# Patient Record
Sex: FEMALE | Race: WHITE | Employment: FULL TIME | URBAN - NONMETROPOLITAN AREA
[De-identification: names, ages, dates, MRNs, and addresses within clinical notes are randomized per-mention and may not be internally consistent; named-entity substitution may affect disease eponyms.]

---

## 2017-05-18 LAB
CHOLESTEROL, TOTAL: 180 MG/DL
CHOLESTEROL/HDL RATIO: 3
CREATININE URINE: 39.97 MG/DL
HBA1C MFR BLD: 6.5 %
HDLC SERPL-MCNC: 61 MG/DL (ref 35–70)
LDL CHOLESTEROL CALCULATED: 109 MG/DL (ref 0–160)
MICROALBUMIN/CREAT 24H UR: <0.7 MG/G{CREAT}
TRIGL SERPL-MCNC: 51 MG/DL
VLDLC SERPL CALC-MCNC: 10 MG/DL

## 2020-12-22 ENCOUNTER — HOSPITAL ENCOUNTER (OUTPATIENT)
Age: 25
Setting detail: SPECIMEN
Discharge: HOME | End: 2020-12-22
Payer: COMMERCIAL

## 2020-12-22 ENCOUNTER — OFFICE VISIT (OUTPATIENT)
Dept: PRIMARY CARE CLINIC | Age: 25
End: 2020-12-22
Payer: COMMERCIAL

## 2020-12-22 VITALS
OXYGEN SATURATION: 99 % | HEART RATE: 73 BPM | HEIGHT: 61 IN | BODY MASS INDEX: 21.3 KG/M2 | DIASTOLIC BLOOD PRESSURE: 64 MMHG | TEMPERATURE: 97.9 F | WEIGHT: 112.8 LBS | SYSTOLIC BLOOD PRESSURE: 110 MMHG | RESPIRATION RATE: 18 BRPM

## 2020-12-22 PROCEDURE — U0003 INFECTIOUS AGENT DETECTION BY NUCLEIC ACID (DNA OR RNA); SEVERE ACUTE RESPIRATORY SYNDROME CORONAVIRUS 2 (SARS-COV-2) (CORONAVIRUS DISEASE [COVID-19]), AMPLIFIED PROBE TECHNIQUE, MAKING USE OF HIGH THROUGHPUT TECHNOLOGIES AS DESCRIBED BY CMS-2020-01-R: HCPCS

## 2020-12-22 PROCEDURE — 99213 OFFICE O/P EST LOW 20 MIN: CPT | Performed by: NURSE PRACTITIONER

## 2020-12-22 PROCEDURE — 99212 OFFICE O/P EST SF 10 MIN: CPT | Performed by: NURSE PRACTITIONER

## 2020-12-22 ASSESSMENT — ENCOUNTER SYMPTOMS
COUGH: 1
ABDOMINAL PAIN: 0
SHORTNESS OF BREATH: 0
SINUS PRESSURE: 1
VOMITING: 0
NAUSEA: 0
PHOTOPHOBIA: 0
RHINORRHEA: 1
DIARRHEA: 1
SORE THROAT: 1
CHEST TIGHTNESS: 0
WHEEZING: 0

## 2020-12-22 ASSESSMENT — PATIENT HEALTH QUESTIONNAIRE - PHQ9
SUM OF ALL RESPONSES TO PHQ QUESTIONS 1-9: 0
SUM OF ALL RESPONSES TO PHQ9 QUESTIONS 1 & 2: 0
1. LITTLE INTEREST OR PLEASURE IN DOING THINGS: 0
2. FEELING DOWN, DEPRESSED OR HOPELESS: 0
SUM OF ALL RESPONSES TO PHQ QUESTIONS 1-9: 0
SUM OF ALL RESPONSES TO PHQ QUESTIONS 1-9: 0

## 2020-12-22 NOTE — PATIENT INSTRUCTIONS
Patient Education        Learning About Coronavirus [phone])  Coronavirus [phone]): Overview  What is coronavirus ([id number])? The coronavirus disease (COVID-19) is caused by a virus. It is an illness that was first found in December 2019. It has since spread worldwide. The virus can cause fever, cough, and trouble breathing. In severe cases, it can cause pneumonia and make it hard to breathe without help. It can cause death. This virus spreads person-to-person through droplets from coughing and sneezing. It can also spread when you are close to someone who is infected. And it can spread when you touch something that has the virus on it, such as a doorknob or a tabletop. Coronaviruses are a large group of viruses. They cause the common cold. They also cause more serious illnesses like [place] respiratory syndrome (MERS) and severe acute respiratory syndrome (SARS). COVID-19 is caused by a novel coronavirus. That means it's a new type that has not been seen in people before. How is COVID-19 treated? Mild illness can be treated at home, but more serious illness needs to be treated in the hospital. Treatment may include medicines to reduce symptoms, plus breathing support such as oxygen therapy or a ventilator. Other treatments, such as antiviral medicines, may help people who have COVID-19. What can you do to protect yourself from COVID-19? The best way to protect yourself from getting sick is to:  · Avoid areas where there is an outbreak. · Avoid contact with people who may be infected. · Avoid crowds and try to stay at least 6 feet away from other people. · Wash your hands often, especially after you cough or sneeze. Use soap and water, and scrub for at least 20 seconds. If soap and water aren't available, use an alcohol-based hand . · Avoid touching your mouth, nose, and eyes. What can you do to avoid spreading the virus to others?   To help avoid spreading the virus to others:  · [address] your hands often with soap or alcohol-based hand sanitizers. · Cover your mouth with a tissue when you cough or sneeze. Then throw the tissue in the trash. · Use a disinfectant to clean things that you touch often. These include doorknobs, remote controls, phones, and handles on your refrigerator and microwave. And don't forget countertops, tabletops, bathrooms, and computer keyboards. · Wear a cloth face cover if you have to go to public areas. If you know or suspect that you have COVID-19:  · Stay home. Don't go to school, work, or public areas. And don't use public transportation, ride-shares, or taxis unless you have no choice. · Leave your home only if you need to get medical care or testing. But call the doctor's office first so they know you're coming. And wear a face cover. · Limit contact with people in your home. If possible, stay in a separate bedroom and use a separate bathroom. · Wear a face cover whenever you're around other people. It can help stop the spread of the virus when you cough or sneeze. · Clean and disinfect your home every day. Use household  and disinfectant wipes or sprays. Take special care to clean things that you grab with your hands. · Self-isolate until it's safe to be around others again. ? If you have symptoms, it's safe when you haven't had a fever for 3 days and your symptoms have improved and it's been at least 10 days since your symptoms started. ? If you were exposed to the virus but don't have symptoms, it's safe to be around others 14 days after exposure. ? Talk to your doctor about whether you also need testing, especially if you have a weakened immune system. When to call for help  Call 911 anytime you think you may need emergency care. For example, call if:  · You have severe trouble breathing. (You can't talk at all.)  · You have constant chest pain or pressure. · You are severely dizzy or lightheaded.   · You are confused or can't think clearly. · Your face and lips have a blue color. · You passed out (lost consciousness) or are very hard to wake up. Call your doctor now if you develop symptoms such as:  · Shortness of breath. · Fever. · Cough. If you need to get care, call ahead to the doctor's office for instructions before you go. Make sure you wear a face cover to prevent exposing other people to the virus. Where can you get the latest information? The following health organizations are tracking and studying this virus. Their websites contain the most up-to-date information. Darya Parra also learn what to do if you think you may have been exposed to the virus. · U.S. Centers for Disease Control and Prevention (CDC): The CDC provides updated news about the disease and travel advice. The website also tells you how to prevent the spread of infection. www.cdc.gov  · World Health Organization [facility]): WHO offers information about the virus outbreaks. WHO also has travel advice. www.who.int  Current as of: July 10, 2020               Content Version: 12.6  © 2006-2020 [employer]. Care instructions adapted under license by [facility] ([facility]). If you have questions about a medical condition or this instruction, always ask your healthcare professional. [address] any warranty or liability for your use of this information. Patient Education        Coronavirus (XTYRR-07): Care Instructions  Overview  The coronavirus disease (COVID-19) is caused by a virus. Symptoms may include a fever, a cough, and shortness of breath. It mainly spreads person-to-person through droplets from coughing and sneezing. The virus also can spread when people are in close contact with someone who is infected. Most people have mild symptoms and can take care of themselves at home.  If their symptoms get worse, they may need care in a hospital. Treatment may include medicines to reduce symptoms, plus breathing support such as oxygen therapy or a ventilator. It's important to not spread the virus to others. If you have COVID-19, wear a face cover anytime you are around other people. You need to isolate yourself while you are sick. Leave your home only if you need to get medical care or testing. Follow-up care is a key part of your treatment and safety. Be sure to make and go to all appointments, and call your doctor if you are having problems. It's also a good idea to know your test results and keep a list of the medicines you take. How can you care for yourself at home? · Get extra rest. It can help you feel better. · Drink plenty of fluids. This helps replace fluids lost from fever. Fluids also help ease a scratchy throat. Water, soup, fruit juice, and hot tea with lemon are good choices. · Take acetaminophen (such as Tylenol) to reduce a fever. It may also help with muscle aches. Read and follow all instructions on the label. · Use petroleum jelly on sore skin. This can help if the skin around your nose and lips becomes sore from rubbing a lot with tissues. Tips for self-isolation  · Limit contact with people in your home. If possible, stay in a separate bedroom and use a separate bathroom. · Wear a cloth face cover when you are around other people. It can help stop the spread of the virus when you cough or sneeze. · If you have to leave home, avoid crowds and try to stay at least 6 feet away from other people. · Avoid contact with pets and other animals. · Cover your mouth and nose with a tissue when you cough or sneeze. Then throw it in the trash right away. · Wash your hands often, especially after you cough or sneeze. Use soap and water, and scrub for at least 20 seconds. If soap and water aren't available, use an alcohol-based hand . · Don't share personal household items. These include bedding, towels, cups and glasses, and eating utensils.   · [employer] laundry in the warmest water allowed for the fabric type, and dry it completely. It's okay to wash other people's laundry with yours. · Clean and disinfect your home every day. Use household  and disinfectant wipes or sprays. Take special care to clean things that you grab with your hands. These include doorknobs, remote controls, phones, and handles on your refrigerator and microwave. And don't forget countertops, tabletops, bathrooms, and computer keyboards. When you can end self-isolation  · If you know or suspect that you have COVID-19, stay in self-isolation until:  ? You haven't had a fever for 3 days, and  ? Your symptoms have improved, and  ? It's been at least 10 days since your symptoms started. · Talk to your doctor about whether you also need testing, especially if you have a weakened immune system. When should you call for help? Call 911 anytime you think you may need emergency care. For example, call if you have life-threatening symptoms, such as:    · You have severe trouble breathing. (You can't talk at all.)     · You have constant chest pain or pressure.     · You are severely dizzy or lightheaded.     · You are confused or can't think clearly.     · Your face and lips have a blue color.     · You pass out (lose consciousness) or are very hard to wake up. Call your doctor now or seek immediate medical care if:    · You have moderate trouble breathing. (You can't speak a full sentence.)     · You are coughing up blood (more than about 1 teaspoon).     · You have signs of low blood pressure. These include feeling lightheaded; being too weak to stand; and having cold, pale, clammy skin. Watch closely for changes in your health, and be sure to contact your doctor if:    · Your symptoms get worse.     · You are not getting better as expected. Call before you go to the doctor's office. Follow their instructions. And wear a cloth face cover. Current as of: July 10, 2020               Content Version: 12.6  © 6087-7411 [facility]. Care instructions adapted under license by [facility] ([facility]). If you have questions about a medical condition or this instruction, always ask your healthcare professional. [place] [address] any warranty or liability for your use of this information. Patient Education        Viral Respiratory Infection: Care Instructions  Your Care Instructions     Viruses are very small organisms. They grow in number after they enter your body. There are many types that cause different illnesses, such as colds and the mumps. The symptoms of a viral respiratory infection often start quickly. They include a fever, sore throat, and runny nose. You may also just not feel well. Or you may not want to eat much. Most viral respiratory infections are not serious. They usually get better with time and self-care. Antibiotics are not used to treat a viral infection. That's because antibiotics will not help cure a viral illness. In some cases, antiviral medicine can help your body fight a serious viral infection. Follow-up care is a key part of your treatment and safety. Be sure to make and go to all appointments, and call your doctor if you are having problems. It's also a good idea to know your test results and keep a list of the medicines you take. How can you care for yourself at home? · Rest as much as possible until you feel better. · Be safe with medicines. Take your medicine exactly as prescribed. Call your doctor if you think you are having a problem with your medicine. You will get more details on the specific medicine your doctor prescribes. · Take an over-the-counter pain medicine, such as acetaminophen (Tylenol), ibuprofen (Advil, Motrin), or naproxen (Aleve), as needed for pain and fever. Read and follow all instructions on the label. Do not give aspirin to anyone younger than 20. It has been linked to Reye syndrome, a serious illness.   · Drink plenty of fluids, enough so that your urine is light yellow or clear like water. Hot fluids, such as tea or soup, may help relieve congestion in your nose and throat. If you have kidney, heart, or liver disease and have to limit fluids, talk with your doctor before you increase the amount of fluids you drink. · Try to clear mucus from your lungs by breathing deeply and coughing. · Gargle with warm salt water once an hour. This can help reduce swelling and throat pain. Use 1 teaspoon of salt mixed in 1 cup of warm water. · Do not smoke or allow others to smoke around you. If you need help quitting, talk to your doctor about stop-smoking programs and medicines. These can increase your chances of quitting for good. To avoid spreading the virus  · Cough or sneeze into a tissue. Then throw the tissue away. · If you don't have a tissue, use your hand to cover your cough or sneeze. Then clean your hand. You can also cough into your sleeve. · Wash your hands often. Use soap and warm water. Wash for 15 to 20 seconds each time. · If you don't have soap and water near you, you can clean your hands with alcohol wipes or gel. When should you call for help? Call your doctor now or seek immediate medical care if:    · You have a new or higher fever.     · Your fever lasts more than 48 hours.     · You have trouble breathing.     · You have a fever with a stiff neck or a severe headache.     · You are sensitive to light.     · You feel very sleepy or confused. Watch closely for changes in your health, and be sure to contact your doctor if:    · You do not get better as expected. Where can you learn more? Go to [email or web address]://[email or web address].[email or web address]. org and sign in to your [email or web address] account. Enter R716 in the [facility] box to learn more about \"Viral Respiratory Infection: Care Instructions. \"     If you do not have an account, please click on the \"Sign Up Now\" link.   Current as of: February 24, 2020               Content Version: 12.6  © 8940-6606 [employer]. Care instructions adapted under license by [facility] ([facility]). If you have questions about a medical condition or this instruction, always ask your healthcare professional. [address] any warranty or liability for your use of this information. Will notify you of COVID test results as soon as available. You should isoloate at home in an area away from family. If you must be around family members, please wear a mask. Quarantine at home until result is available. This means do not go to work/school, attend family gatherings, or invite others to your home until you know your test results.

## 2020-12-22 NOTE — LETTER
[address] [facility]  [address] [facility] [address]  Phone: [phone]  Fax: [phone]    CONY Ward CNP        December 22, 2020     Patient: Elease Favre   YOB: 1995   Date of Visit: 12/22/2020       To Whom it May Concern:    Mariam Eric was seen in my clinic on 12/22/2020. May return to work with negative Covid-19 test result and improved symptoms. Test result in 3-7 days. If you have any questions or concerns, please don't hesitate to call.     Sincerely,         CONY Ward CNP

## 2020-12-22 NOTE — PROGRESS NOTES
[facility]             [address] [facility] [address], [address]                        Telephone [phone]             Fax [phone]     Andrew Madison  1995  DFN:[id number]   Date of visit:  12/22/2020    Subjective:    Andrew Madison is a 31 y.o.  female who presents to [facility] today (12/22/2020) for evaluation of:    Chief Complaint   Patient presents with    Headache     ST, cough, runny noise, congestion, started 2-3 past       Headache   This is a new problem. The current episode started in the past 7 days (12/19/20). The problem occurs intermittently. The problem has been waxing and waning. The pain is located in the frontal region. The pain does not radiate. The pain quality is similar to prior headaches. The quality of the pain is described as aching and dull. The pain is at a severity of 6/10. Associated symptoms include coughing (dry), rhinorrhea, sinus pressure and a sore throat. Pertinent negatives include no abdominal pain, fever, muscle aches, nausea, phonophobia, photophobia or vomiting. Associated symptoms comments: Nasal congestion; postnasal drainage. Nothing aggravates the symptoms. Treatments tried: ibuprofen. The treatment provided mild relief. Exposure to several coworkers who tested positive for Covid in the last 2 weeks. She has the following problem list:  Patient Active Problem List   Diagnosis    Diabetes mellitus type 1 ([address]    Dysmenorrhea    Allergic rhinitis    Hyperlipidemia        Current medications are:  Current Outpatient Medications   Medication Sig Dispense Refill    insulin glargine (LANTUS SOLOSTAR) 100 UNIT/ML injection pen Inject 11 Units into the skin daily.  5 Pen 11    insulin lispro (HUMALOG KWIKPEN) 100 UNIT/ML pen Per sliding scale as needed four times daily (uses up to about 15 units a day) 5 Pen 11    Insulin Pen Needle (PEN NEEDLES 5/16\") 30G X 8 MM MISC 1 each by Does not apply route 4 times daily. 100 each 20     No current facility-administered medications for this visit. She is allergic to other and citalopram..    She  reports that she has never smoked. She has never used smokeless tobacco.      Objective:    Vitals:    12/22/20 0930   BP: 110/64   Site: Right Upper Arm   Position: Sitting   Pulse: 73   Resp: 18   Temp: 97.9 °F (36.6 °C)   SpO2: 99%   Weight: 112 lb 12.8 oz (51.2 kg)   Height: 5' 1\" (1.549 m)     Body mass index is 21.31 kg/m². Review of Systems   Constitutional: Positive for appetite change, chills and fatigue. Negative for fever. HENT: Positive for congestion, postnasal drip, rhinorrhea, sinus pressure and sore throat. Eyes: Negative for photophobia. Respiratory: Positive for cough (dry). Negative for chest tightness, shortness of breath and wheezing. Cardiovascular: Negative. Gastrointestinal: Positive for diarrhea (loose stools). Negative for abdominal pain, nausea and vomiting. Neurological: Positive for headaches. Physical Exam  Vitals signs and nursing note reviewed. Constitutional:       Appearance: She is well-developed. HENT:      Head: Normocephalic. Jaw: There is normal jaw occlusion. Right Ear: Tympanic membrane, ear canal and external ear normal.      Left Ear: Tympanic membrane, ear canal and external ear normal.      Nose: Congestion present. Right Turbinates: Swollen (erythema). Left Turbinates: Swollen (erythema). Right Sinus: No maxillary sinus tenderness or frontal sinus tenderness. Left Sinus: No maxillary sinus tenderness or frontal sinus tenderness. Mouth/Throat:      Lips: Pink. Mouth: Mucous membranes are moist.      Pharynx: Oropharynx is clear. Uvula midline. Eyes:      Pupils: Pupils are equal, round, and reactive to light. Neck:      Musculoskeletal: Normal range of motion and neck supple.    Cardiovascular:      Rate and Rhythm: Normal rate and regular rhythm. Heart sounds: Normal heart sounds. Pulmonary:      Effort: Pulmonary effort is normal.      Breath sounds: Normal breath sounds and air entry. Lymphadenopathy:      Cervical: No cervical adenopathy. Skin:     General: Skin is warm and dry. Neurological:      Mental Status: She is alert and oriented to person, place, and time. Psychiatric:         Behavior: Behavior normal.         Thought Content: Thought content normal.       Assessment and Plan:    No results found for this visit on 12/22/20. Diagnosis Orders   1. Upper respiratory tract infection, unspecified type  Covid-19 Ambulatory   2. Person under investigation for COVID-19  Covid-19 Ambulatory   3. Close exposure to COVID-19 virus  Covid-19 Ambulatory     Self quarantine until negative Covid-19 test result received and symptoms improving. We will call with Covid-19 test results. I recommended that she use mucinex to help with congestion and cough. I also recommended Flonase and an antihistamine for sinus symptoms. she was also encouraged to use tylenol or ibuprofen for pain/fever. Increase water intake. Use cool mist humidifier at bedtime. Use nasal saline flush as needed. Good hand hygiene. she was instructed to return if there is no improvement or symptoms worsen. The use, risks, benefits, and side effects of prescribed or recommended medications were discussed. All questions were answered and the patient/caregiver voiced understanding. No orders of the defined types were placed in this encounter.         Electronically signed by CONY Morris CNP on 12/22/20 at 10:13 AM EST

## 2020-12-25 LAB — SARS-COV-2, NAA: NOT DETECTED

## 2021-03-02 LAB
AVERAGE GLUCOSE: NORMAL
HBA1C MFR BLD: 8.3 %

## 2021-11-11 ENCOUNTER — HOSPITAL ENCOUNTER (OUTPATIENT)
Dept: GENERAL RADIOLOGY | Age: 26
Discharge: HOME | End: 2021-11-13
Payer: COMMERCIAL

## 2021-11-11 ENCOUNTER — OFFICE VISIT (OUTPATIENT)
Dept: INTERNAL MEDICINE | Age: 26
End: 2021-11-11
Payer: COMMERCIAL

## 2021-11-11 VITALS
SYSTOLIC BLOOD PRESSURE: 116 MMHG | DIASTOLIC BLOOD PRESSURE: 60 MMHG | HEIGHT: 61 IN | HEART RATE: 72 BPM | BODY MASS INDEX: 21.52 KG/M2 | WEIGHT: 114 LBS | RESPIRATION RATE: 16 BRPM

## 2021-11-11 DIAGNOSIS — G56.01 RIGHT CARPAL TUNNEL SYNDROME: ICD-10-CM

## 2021-11-11 DIAGNOSIS — G56.01 RIGHT CARPAL TUNNEL SYNDROME: Primary | ICD-10-CM

## 2021-11-11 PROBLEM — F31.9 BIPOLAR 1 DISORDER (HCC): Status: ACTIVE | Noted: 2017-04-13

## 2021-11-11 PROCEDURE — 99203 OFFICE O/P NEW LOW 30 MIN: CPT | Performed by: INTERNAL MEDICINE

## 2021-11-11 PROCEDURE — 73110 X-RAY EXAM OF WRIST: CPT

## 2021-11-11 PROCEDURE — L3908 WHO COCK-UP NONMOLDE PRE OTS: HCPCS | Performed by: INTERNAL MEDICINE

## 2021-11-11 SDOH — ECONOMIC STABILITY: FOOD INSECURITY: WITHIN THE PAST 12 MONTHS, THE FOOD YOU BOUGHT JUST DIDN'T LAST AND YOU DIDN'T HAVE MONEY TO GET MORE.: NEVER TRUE

## 2021-11-11 SDOH — ECONOMIC STABILITY: FOOD INSECURITY: WITHIN THE PAST 12 MONTHS, YOU WORRIED THAT YOUR FOOD WOULD RUN OUT BEFORE YOU GOT MONEY TO BUY MORE.: NEVER TRUE

## 2021-11-11 ASSESSMENT — SOCIAL DETERMINANTS OF HEALTH (SDOH): HOW HARD IS IT FOR YOU TO PAY FOR THE VERY BASICS LIKE FOOD, HOUSING, MEDICAL CARE, AND HEATING?: NOT HARD AT ALL

## 2021-11-11 ASSESSMENT — PATIENT HEALTH QUESTIONNAIRE - PHQ9
5. POOR APPETITE OR OVEREATING: 0
7. TROUBLE CONCENTRATING ON THINGS, SUCH AS READING THE NEWSPAPER OR WATCHING TELEVISION: 0
10. IF YOU CHECKED OFF ANY PROBLEMS, HOW DIFFICULT HAVE THESE PROBLEMS MADE IT FOR YOU TO DO YOUR WORK, TAKE CARE OF THINGS AT HOME, OR GET ALONG WITH OTHER PEOPLE: 0
SUM OF ALL RESPONSES TO PHQ QUESTIONS 1-9: 9
1. LITTLE INTEREST OR PLEASURE IN DOING THINGS: 2
SUM OF ALL RESPONSES TO PHQ QUESTIONS 1-9: 9
9. THOUGHTS THAT YOU WOULD BE BETTER OFF DEAD, OR OF HURTING YOURSELF: 0
3. TROUBLE FALLING OR STAYING ASLEEP: 0
2. FEELING DOWN, DEPRESSED OR HOPELESS: 2
SUM OF ALL RESPONSES TO PHQ QUESTIONS 1-9: 9
8. MOVING OR SPEAKING SO SLOWLY THAT OTHER PEOPLE COULD HAVE NOTICED. OR THE OPPOSITE, BEING SO FIGETY OR RESTLESS THAT YOU HAVE BEEN MOVING AROUND A LOT MORE THAN USUAL: 0
6. FEELING BAD ABOUT YOURSELF - OR THAT YOU ARE A FAILURE OR HAVE LET YOURSELF OR YOUR FAMILY DOWN: 3
4. FEELING TIRED OR HAVING LITTLE ENERGY: 2
SUM OF ALL RESPONSES TO PHQ9 QUESTIONS 1 & 2: 4

## 2021-11-11 NOTE — PATIENT INSTRUCTIONS
time to ease pain. Put a thin cloth between the ice and your skin. · If your doctor or your physical or occupational therapist tells you to wear a wrist splint, wear it as directed to keep your wrist in a neutral position. This also eases pressure on your median nerve. · Ask your doctor whether you should have physical or occupational therapy to learn how to do tasks differently. · Try a yoga class to stretch your muscles and build strength in your hands and wrists. Yoga has been shown to ease carpal tunnel symptoms. To prevent carpal tunnel  · When working at a [employer], keep your hands and wrists in line with your forearms. Hold your elbows close to your sides. Take a break every 10 to 15 minutes. · Try these exercises:  ? Warm up: Rotate your wrist up, down, and from side to side. Repeat this 4 times. Stretch your fingers far apart, relax them, then stretch them again. Repeat 4 times. Stretch your thumb by pulling it back gently, holding it, and then releasing it. Repeat 4 times. ? Prayer stretch: Start with your palms together in front of your chest just below your chin. Slowly lower your hands toward your waistline while keeping your hands close to your stomach and your palms together until you feel a mild to moderate stretch under your forearms. Hold for 10 to 20 seconds. Repeat 4 times. ? Wrist flexor stretch: Hold your arm in front of you with your palm up. Bend your wrist, pointing your hand toward the floor. With your other hand, gently bend your wrist further until you feel a mild to moderate stretch in your forearm. Hold for 10 to 20 seconds. Repeat 4 times. ? Wrist extensor stretch: Repeat the steps for the wrist flexor stretch, but begin with your extended hand palm down. · Squeeze a rubber exercise ball several times a day to keep your hands and fingers strong. · Avoid holding objects (such as a book) in one position for a long time. When possible, use your whole hand to grasp an object. Using just the thumb and index finger can put stress on the wrist.  · Do not smoke. It can make this condition worse by reducing blood flow to the median nerve. If you need help quitting, talk to your doctor about stop-smoking programs and medicines. These can increase your chances of quitting for good. When should you call for help? Watch closely for changes in your health, and be sure to contact your doctor if:    · Your pain or other problems do not get better with home care.     · You want more information about physical or occupational therapy.     · You have side effects of your corticosteroid medicine, such as:  ? Weight gain. ? Mood changes. ? Trouble sleeping. ? Bruising easily.     · You have any other problems with your medicine. Where can you learn more? Go to https://[employer]. org and sign in to your emaze account. Enter R432 in the [employer] box to learn more about \"Carpal Tunnel Syndrome: Care Instructions. \"     If you do not have an account, please click on the \"Sign Up Now\" link. Current as of: July 1, 2021               Content Version: 13.0  © 3185-6462 [employer]. Care instructions adapted under license by [facility] ([facility]). If you have questions about a medical condition or this instruction, always ask your healthcare professional. [place] [address] any warranty or liability for your use of this information. Patient Education        Carpal Tunnel Syndrome: Exercises  Introduction  Here are some examples of exercises for you to try. The exercises may be suggested for a condition or for rehabilitation. Start each exercise slowly. Ease off the exercises if you start to have pain. You will be told when to start these exercises and which ones will work best for you. Warm-up stretches  When you no longer have pain or numbness, you can do exercises to help prevent carpal tunnel syndrome from coming back.  Do not do any [facility] disclaims any warranty or liability for your use of this information. Patient Education         Carpal Tunnel Syndrome: A Few Tips for Preventing It (02:51)  Your health professional recommends that you watch this short online health video. Learn what movements may help cause carpal tunnel syndrome. Get tips to help prevent it and manage symptoms. Purpose:  Teaches what movements may help lead to carpal tunnel syndrome. Provides ideas to help prevent it and manage symptoms. Goal:  The user will learn movements that may make carpal tunnel syndrome more likely and get tips to prevent it and manage symptoms. How to watch the video    Scan the QR code   OR Visit the website    [email or web address] [email or web address]   Current as of: July 1, 2021               Content Version: 13.0  © 2006-2021 [employer]. Care instructions adapted under license by [employer] ([facility]). If you have questions about a medical condition or this instruction, always ask your healthcare professional. [place] [address] any warranty or liability for your use of this information. Patient Education         Carpal Tunnel Syndrome: Stretches (02:43)  Your health professional recommends that you watch this short online health video. Learn stretches that can help you prevent carpal tunnel syndrome and manage your symptoms. Purpose:  Demonstrates stretches that can be used to help prevent carpal tunnel syndrome and manage its symptoms. Goal:  The user will learn stretches that may help prevent wrist problems such as carpal tunnel syndrome and manage its symptoms. How to watch the video    Scan the QR code   OR Visit the website    [email or web address] [email or web address]/r/[email or web address]   Current as of: July 1, 2021               Content Version: 13.0  © 2006-2021 [employer]. Care instructions adapted under license by [employer] [facility]).  If you have questions about a medical condition or this instruction, always ask your healthcare professional. [place] [address] any warranty or liability for your use of this information.

## 2021-11-11 NOTE — PROGRESS NOTES
DR. Patricia Corrales - PROGRESS NOTE    CHIEF COMPLAINT/HISTORY OF CHIEF COMPLAINT: This 31 y.o.  female comes in today complaining of right wrist and hand pain, which she has had intermittently for the last year or so. When she does get it, it is worse in the morning and gets better as the day goes on. There is a little numbness in her fingers and a tightness in the hand and wrist. Sometimes she has been awakened by the pain (but not in the last couple of days). The last two days it has gotten a lot worse. She will wake up and can't make a fist because of the tightness. Then she gets a \"shooting pain\" into the hand from the wrist. A while back it also would radiate into the elbow, but that has not happened this time yet. 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 These last two days she has been doing more work with the screwdriver and taking things apart. 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 She has tried using ibuprofen for the pain, which seems to help, but she doesn't take it all that often. She decided to come in and get it checked out. ALLERGIES/INTOLERANCES:   Allergies   Allergen Reactions    Other      shrimp    Citalopram Rash       MEDICATIONS:   Outpatient Medications Marked as Taking for the 11/11/21 encounter (Office Visit) with Zach Sahu, DO   Medication Sig Dispense Refill    insulin lispro (HUMALOG KWIKPEN) 100 UNIT/ML pen Per sliding scale as needed four times daily (uses up to about 15 units a day) 5 Pen 11    Insulin Pen Needle (PEN NEEDLES 5/16\") 30G X 8 MM MISC 1 each by Does not apply route 4 times daily. 100 each 20       IMMUNIZATIONS: Reviewed for influenza and pneumococcal status as indicated in electronic record.     REVIEW OF SYSTEMS:     Please see history of chief complaint above; otherwise no new problems with respect to General, HEENT, Cardiovascular, Respiratory, Gastrointestinal, Genitourinary, Endocrinologic, Musculoskeletal, or Neuropsychiatric complaints. PHYSICAL EXAMINATION:    Wt Readings from Last 2 Encounters:   11/11/21 114 lb (51.7 kg)   12/22/20 112 lb 12.8 oz (51.2 kg)       Vitals:    11/11/21 1625   BP: 116/60   Site: Right Upper Arm   Position: Sitting   Cuff Size: Medium Adult   Pulse: 72   Resp: 16   Weight: 114 lb (51.7 kg)   Height: 5' 1\" (1.549 m)     Body mass index is 21.54 kg/m². General: This is a 31 y.o.  female who is alert and oriented to person, place and time. She appears to be her stated age and does not appear to be in any acute distress. HEENT/Neck: essentially unremarkable  Lungs: Normal - CTA without rales, rhonchi, or wheezing. Heart: regular rate and rhythm, S1, S2 normal, no murmur, click, rub or gallop No S3 or S4. Extremities: There is no clubbing, cyanosis, or edema in any of the extremities. ASSESSMENT/PLAN:    1. Right carpal tunnel syndrome  - We will get an x-ray of her right wrist today on her way out  - We will give her a wrist brace to wear at night and when she is at work  - We will give her some exercises to try  - We will have her come back in a month to see how she is doing with this  - We will give her an off work slip  - XR WRIST RIGHT (MIN 3 VIEWS); Future  - Procare Comfort Form Wrist Brace      Orders Placed This Encounter   Procedures    XR WRIST RIGHT (MIN 3 VIEWS)     Standing Status:   Future     Standing Expiration Date:   11/11/2022     Order Specific Question:   Reason for exam:     Answer:   right wrist pain    Procare Comfort Form Wrist Brace     Patient was prescribed a Procare Comfort Form Wrist brace. The right wrist will require stabilization / immobilization from this semi-rigid / rigid orthosis to improve their function.   The orthosis will assist in protecting the affected area, provide functional support and facilitate healing. The patient was educated and fit by a healthcare professional with expert knowledge and specialization in brace application while under the direct supervision of the treating physician. Verbal and written instructions for the use of and application of this item were provided. They were instructed to contact the office immediately should the brace result in increased pain, decreased sensation, increased swelling or worsening of the condition. Requested Prescriptions      No prescriptions requested or ordered in this encounter       Return in about 1 month (around 12/11/2021).         Electronically signed by Stephanie Connor DO on 11/11/2021 at 5:10 PM  Internal Medicine

## 2021-12-30 ENCOUNTER — OFFICE VISIT (OUTPATIENT)
Dept: INTERNAL MEDICINE | Age: 26
End: 2021-12-30
Payer: COMMERCIAL

## 2021-12-30 VITALS
DIASTOLIC BLOOD PRESSURE: 68 MMHG | BODY MASS INDEX: 21.14 KG/M2 | SYSTOLIC BLOOD PRESSURE: 116 MMHG | WEIGHT: 112 LBS | HEIGHT: 61 IN | HEART RATE: 88 BPM | RESPIRATION RATE: 16 BRPM

## 2021-12-30 DIAGNOSIS — E10.9 TYPE 1 DIABETES MELLITUS WITHOUT COMPLICATION (HCC): ICD-10-CM

## 2021-12-30 DIAGNOSIS — G56.01 CARPAL TUNNEL SYNDROME OF RIGHT WRIST: ICD-10-CM

## 2021-12-30 DIAGNOSIS — G56.31 RADIAL TUNNEL SYNDROME OF RIGHT UPPER EXTREMITY: Primary | ICD-10-CM

## 2021-12-30 PROCEDURE — 99214 OFFICE O/P EST MOD 30 MIN: CPT | Performed by: INTERNAL MEDICINE

## 2021-12-30 NOTE — PROGRESS NOTES
DR. Ibis Parnell - PROGRESS NOTE    CHIEF COMPLAINT/HISTORY OF CHIEF COMPLAINT: This 31 y.o.  female comes in today to see how her right wrist is doing with the carpal tunnel syndrome since her last visit with us at the beginning of November. Since the last time she was here she moved to a new job where she is not doing as much repetitive motion with the right hand and wrist so the pain is much better now. The swelling is gone from her wrist as well. She had mainly been using the brace at night until she moved to the new job. Now she is also reporting some numbness and tingling in her right forearm from the elbow to the wrist, which she had before but didn't really think to report it last time. It gets worse when she bends her elbow and goes away when she completely straightens her right arm. She is also a type 1 diabetic and uses an insulin pump. She needs a refill on her insulin. Her pump is over 11years old now and out of warranty. She is interested in finding out how to get a new pump. There are no other complaints. ALLERGIES/INTOLERANCES:   Allergies   Allergen Reactions    Other      shrimp    Citalopram Rash       MEDICATIONS:   Outpatient Medications Marked as Taking for the 12/30/21 encounter (Office Visit) with Javierbishop Cruz, DO   Medication Sig Dispense Refill    insulin lispro (HUMALOG KWIKPEN) 100 UNIT/ML pen Per sliding scale as needed four times daily (uses up to about 15 units a day) 5 Pen 11    Insulin Pen Needle (PEN NEEDLES 5/16\") 30G X 8 MM MISC 1 each by Does not apply route 4 times daily. 100 each 20       IMMUNIZATIONS: Reviewed for influenza and pneumococcal status as indicated in electronic record. REVIEW OF SYSTEMS:     Please see history of chief complaint above; otherwise no new problems with respect to General, HEENT, Cardiovascular, Respiratory, Gastrointestinal, Genitourinary, Endocrinologic, Musculoskeletal, or Neuropsychiatric complaints.        PHYSICAL EXAMINATION:    Wt Readings from Last 2 Encounters:   12/30/21 112 lb (50.8 kg)   11/11/21 114 lb (51.7 kg)       Vitals:    12/30/21 1615   BP: 116/68   Site: Right Upper Arm   Position: Sitting   Cuff Size: Large Adult   Pulse: 88   Resp: 16   Weight: 112 lb (50.8 kg)   Height: 5' 1\" (1.549 m)     Body mass index is 21.16 kg/m². General: This is a 31 y.o.  female who is alert and oriented to person, place and time. She appears to be her stated age and does not appear to be in any acute distress. HEENT/Neck: essentially unremarkable  Lungs: Normal - CTA without rales, rhonchi, or wheezing. Heart: regular rate and rhythm, S1, S2 normal, no murmur, click, rub or gallop No S3 or S4. Extremities: There is no clubbing, cyanosis, or edema in any of the extremities. There was tenderness to palpation at the radial side of the right elbow. ASSESSMENT/PLAN:    1. Radial tunnel syndrome of right upper extremity  - It sounds like she could have radial tunnel syndrome  - We will refer her to physical therapy for evaluation and treatment  - [facility] [place]    2. Carpal tunnel syndrome of right wrist, improved  - This is greatly improved since she changed jobs  - We will continue to monitor for now    3. Type 1 diabetes mellitus without complication (HCC)  - We refilled her insulin  - We will refer her to Neno Silveira to get her started with her for management and to begin the process of getting a new pump.   - [address], NP, Diabetes Management, [place]      Orders Placed This Encounter   Procedures   [address] [facility] [place]     Referral Priority:   Routine     Referral Type:   Eval and Treat     Referral Reason:   Specialty Services Required     Requested Specialty:   Physical Therapy     Number of Visits Requested:   1    [address], NP, Diabetes Management, [place]     Referral Priority:   Routine     Referral Type:   Eval and Treat     Referral Reason: Specialty Services Required     Referred to Provider:   CONY Narvaez CNP     Requested Specialty:   Nurse Practitioner     Number of Visits Requested:   1       Requested Prescriptions     Signed Prescriptions Disp Refills    insulin aspart (NOVOLOG) 100 UNIT/ML injection vial 10 mL 11     Sig: Use as directed in insulin pump. Max 22 units per day. Medications as ordered above. Return in about 6 weeks (around 2/10/2022).         Electronically signed by Carlos Moy DO on 12/30/2021 at 5:05 PM  Internal Medicine

## 2022-01-03 DIAGNOSIS — G56.31 RADIAL TUNNEL SYNDROME OF RIGHT UPPER EXTREMITY: Primary | ICD-10-CM

## 2022-01-21 ENCOUNTER — HOSPITAL ENCOUNTER (OUTPATIENT)
Dept: OCCUPATIONAL THERAPY | Age: 27
Setting detail: THERAPIES SERIES
Discharge: HOME | End: 2022-01-21
Payer: COMMERCIAL

## 2022-01-21 PROCEDURE — 97165 OT EVAL LOW COMPLEX 30 MIN: CPT

## 2022-01-21 PROCEDURE — 97110 THERAPEUTIC EXERCISES: CPT

## 2022-01-21 ASSESSMENT — 9 HOLE PEG TEST
TEST_RESULT: NOT TESTED
TEST_RESULT: NOT TESTED

## 2022-01-21 NOTE — FLOWSHEET NOTE
[facility]    [x] [place]  Phone: [phone]  Fax: [phone]      [] [place]  Phone: [phone]  Fax: [phone]    Occupational Therapy Daily Treatment Note  Date:  2022    Patient Name:  Miguel A Craft    :  1995  MRN: [id number]  Restrictions/Precautions:      Medical/Treatment Diagnosis Information:   Diagnosis: Radial tunnel syndrome of right upper extremity      Insurance/Certification information:   Physician Information: Referring Practitioner: Elroy Laguna DO  Plan of care signed (Y/N):   n    Visit# / total visits:      Pain level: 0/10     Progress Note: [x]  Yes  []  No  Next due by: Visit #10      Date of evaluation/re-evaluation: 22    Time In: 1250  Time Out:  128    Subjective:   See progress report    Objective/Assessment:   Initial evaluation completed this date, see progress report for details    Provided patient with handouts for HEP, see below for details    Exercises:   Exercise/Equipment Resistance/Repetitions Other comments   Carpal tunnel stretches x    Median nerve glides x    Radial nerve glides x                                                            Therapeutic Exercise  [x] Provided verbal/tactile cueing for activities related to strengthening, flexibility, endurance, ROM. (71107)  Neuro  Re-Ed  [] Provided verbal/tactile cueing for activities related to improving balance, coordination, kinesthetic sense, posture, motor skill, proprioception. (93453)     Therapeutic Activities/ADL:   [] Provided use of dynamic activities to improve functional performance (26276)  [] Provided self-care/home management training for activities of daily living and compensatory training (04807)     Manual Treatments:   [] Provided manual therapy to mobilize soft tissue/joints for the purpose of modulating pain, promoting relaxation, increasing ROM, reducing/eliminating soft tissue swelling/inflammation/restriction, improving soft tissue extensibility. (59716)     Orthotic Management:   [] Provided assessment and fitting orthotic device for improved functional performance.  (16305)    Service Based Modalities:  1 low complexity eval    Timed Code Treatment Minutes:   15 there ex    Total Treatment Minutes:   38 minutes    Treatment/Activity Tolerance:  [x] Patient tolerated treatment well [] Patient limited by fatique  [] Patient limited by pain  [] Patient limited by other medical complications  [] Other:     Prognosis: [x] Good [] Fair  [] Poor    Patient Requires Follow-up: [] Yes  [x] No      Goals:  Short term goals  Time Frame for Short term goals: 1/21/2022  Short term goal 1: Provide patient education for HEP to help reduce symptoms of tingling and numbness in RUE -GOAL MET  Short term goal 2: Provide patient education for modifications for liftin/carrying objects to reduce symtpoms of tingling and numbness -GOAL MET       Plan:   [] Continue per plan of care [] Alter current plan (see comments)  [x] Plan of care initiated [] Hold pending MD visit [x] Discharge    Plan for Next Session:      Electronically signed by:  DAV Guillaume, OTR/L

## 2022-01-21 NOTE — PROGRESS NOTES
Flexion 0-145: 140°  R Elbow Extension 145-0: 0°  R Forearm Pron 0-90: 90°  R Forearm Supination  0-90: 90°  R Wrist Flexion 0-80: 93°  Right Hand AROM (degrees)  Right Hand AROM: WNL  LUE Strength  Gross LUE Strength: WNL  L Shoulder Flex: 5/5  L Shoulder Ext: 5/5  L Shoulder ABduction: 5/5  L Shoulder ADduction: 5/5  L Elbow Flex: 5/5  L Elbow Ext: 5/5  L Forearm Pron: 5/5  L Forearm Sup: 5/5  RUE Strength  Gross RUE Strength: WNL  R Shoulder Flex: 5/5  R Shoulder Ext: 5/5  R Shoulder ABduction: 5/5  R Shoulder ADduction: 5/5  R Elbow Flex: 5/5  R Elbow Ext: 5/5  R Forearm Pron: 5/5  R Forearm Sup: 5/5  Hand Dominance  Hand Dominance: Right  Left Hand Strength -  (lbs)  Handle Setting 2: 79 (Avg= 63.5)  LUE Edema - Circumference (cm)  LUE Edema Present?: No  Left 9-Hole Peg Test  Left 9-Hole Peg Test: Not Tested  Right Hand Strength -  (lbs)  Handle Setting 2: 80 (Avg= 74.5)  RUE Edema - Circumference (cm)  RUE Edema Present?: No  Right 9-Hole Peg Test  Right 9-Hole Peg Test: Not Tested  Fine Motor Skills  Left 9-Hole Peg Test: Not Tested  Right 9-Hole Peg Test: Not Tested  Other Assessment: Phalen's test- negative;  Long finger test- negative; Mill's test- negative  Hand Assessment Comment  Hand Assessment Comment: Upper Extremity Functional Index: 80/80, indicating 0% level of impairment with daily tasks    Assessment   Assessment  Treatment Diagnosis: Radial Tunnel Syndrome  Prognosis: Good  Decision Making: Low Complexity  REQUIRES OT FOLLOW UP: No       Plan   Plan  Times per week: eval and one time treat    Goals  Short term goals  Time Frame for Short term goals: 1/21/2022  Short term goal 1: Provide patient education for HEP to help reduce symptoms of tingling and numbness in RUE -GOAL MET  Short term goal 2: Provide patient education for modifications for liftin/carrying objects to reduce symtpoms of tingling and numbness -GOAL MET       Therapy Time   Individual Concurrent Group Co-treatment Time In 1250         Time Out 1328         Minutes 38         Timed Code Treatment Minutes: [address] OTD, OTR/L

## 2022-01-21 NOTE — PLAN OF CARE
Occupational Therapy    [x] [place]  Phone: [phone]  Fax: [phone]      [] [place]  Phone: [phone]  Fax: [phone]       To: Referring Practitioner: Venkatesh Pederson DO      Patient: Fany Baker  : 1995  MRN: [id number]  Evaluation Date: 2022      Diagnosis Information:  Diagnosis: Radial tunnel syndrome of right upper extremity         Occupational Therapy Certification/Re-Certification Form  Dear Dr. Jeffers Prior,  The following patient has been evaluated for occupational therapy services and for therapy to continue, insurance requires physician review of the treatment plan. Please review the attached evaluation and/or summary of the patient's plan of care, and verify that you agree therapy should continue by signing the attached document and sending it back to our office.     Plan of Care/Treatment to date: 22 (eval and one time treat)  [x] Therapeutic Exercise   [] Modalities:  [x] Therapeutic Activity    [] Ultrasound  [] Electrical Stimulation   [x] Activities of Daily Living    [] Paraffin   [] Kinesiotaping  [] Neuromuscular Re-education   [] Iontophoresis [] Coldpack/hotpack   [x] Instruction in HEP     [] Orthotics/splint []   [x] Manual Therapy       [] Aquatic Therapy            Frequency/Duration:  # Days per week: [x] 1 day # Weeks: [] 1 week [] 5 weeks      [] 2 days   [] 2 weeks [] 6 weeks     [] 3 days   [] 3 weeks [] 7 weeks     [] 4 days   [] 4 weeks [] 8 weeks  Goals:  Short term goals  Time Frame for Short term goals: 2022  Short term goal 1: Provide patient education for HEP to help reduce symptoms of tingling and numbness in RUE -GOAL MET  Short term goal 2: Provide patient education for modifications for liftin/carrying objects to reduce symtpoms of tingling and numbness -GOAL MET       Rehab Potential: [] excellent [x] good [] fair  [] poor     Electronically signed by:  DAV Mcclain, OTR/L      If you have any questions or concerns, please don't hesitate to call.   Thank you for your referral.      Physician Signature:________________________________Date:__________________  By signing above, therapists plan is approved by physician

## 2022-02-23 ENCOUNTER — OFFICE VISIT (OUTPATIENT)
Dept: DIABETES SERVICES | Age: 27
End: 2022-02-23
Payer: COMMERCIAL

## 2022-02-23 VITALS
DIASTOLIC BLOOD PRESSURE: 60 MMHG | HEART RATE: 80 BPM | RESPIRATION RATE: 16 BRPM | SYSTOLIC BLOOD PRESSURE: 120 MMHG | BODY MASS INDEX: 21.14 KG/M2 | WEIGHT: 112 LBS | HEIGHT: 61 IN

## 2022-02-23 DIAGNOSIS — E10.9 TYPE 1 DIABETES MELLITUS WITHOUT COMPLICATION (HCC): Primary | ICD-10-CM

## 2022-02-23 PROCEDURE — 99205 OFFICE O/P NEW HI 60 MIN: CPT | Performed by: NURSE PRACTITIONER

## 2022-02-23 RX ORDER — GLUCAGON INJECTION, SOLUTION 1 MG/.2ML
1 INJECTION, SOLUTION SUBCUTANEOUS PRN
Qty: 1 EACH | Refills: 1 | Status: SHIPPED | OUTPATIENT
Start: 2022-02-23

## 2022-02-23 ASSESSMENT — ENCOUNTER SYMPTOMS
RESPIRATORY NEGATIVE: 1
ABDOMINAL PAIN: 0
SHORTNESS OF BREATH: 0
DIARRHEA: 0

## 2022-02-23 NOTE — PROGRESS NOTES
[address] [facility] INTERNAL MED A DEPARTMENT OF [place] [address]  [address] [facility], [address]  [facility] [address] [phone]        HISTORY:    Abril Bearden presents today for evaluation and management of:  Chief Complaint   Patient presents with   Lucretia Nicole Doctor     referral of Dr. Humphrey Self       HPI    Interval History:      Current Diabetic Medications  humalog for use in tslim insulin pump TDD 20 units    DKA episodes: age 24 with diagnosis      02/23/22   Abril Bearden is a 31 y.o. female patient who presents to clinic today forher diabetes. she denies any signs or symptoms of hyper/hypoglycemia. she states they are taking their medications as prescribed without any adverse effects. She was diagnosed at age 24 with DKA type 1 diabetes started on MDI transition to insulin pump shortly after. She is previously seen by endocrinology however was not able to get her supplies and would like to switch and get established for diabetes management. She does admit that her mental health makes it difficult for her to manage her diabetes. She does see a counselor online. She has a history of bad reactions with depression medications and at this time is resistant to try new medication. Diet: Counting carbohydrate  Exercise: None  BS testing: Occasionally does have a Dexcom and supplies. Issues: Denies  Diabetic foot exam up-to-date: No  Diabetic retinal exam up-to-date: No  Hypoglycemia as needed treatment: snack, apple juice, glucose tabs and glucose juice. Has urine ketone strips. High cholesterol-   Watches diet and exercise.            Past Medical History:   Diagnosis Date    Allergic rhinitis     Depression     Used to take citalopram, but it caused allergic reaction    Diabetes mellitus type 1 ([address]     diagnosed at age 24 after influenza infection    Dysmenorrhea     with heavy menses    Hyperlipidemia      Family History   Problem Relation Age of Onset    Inflam Bowel Dis Mother     Other Mother         GERD    Diabetes Paternal Grandfather         type 1     Social History     Tobacco Use    Smoking status: Former Smoker     Packs/day: 0.10     Types: Cigarettes     Start date: 2021     Quit date: 2021     Years since quittin.2    Smokeless tobacco: Former User     Quit date: 2021    Tobacco comment: Never was a consistant smoker, very light occasional smoker   Vaping Use    Vaping Use: Never used   Substance Use Topics    Alcohol use: No    Drug use: Yes     Frequency: 7.0 times per week     Types: Marijuana (Weed)     Comment: smokes one per day     Allergies   Allergen Reactions    Other Hives and Swelling     Shrimp- Swelling around mouth    Citalopram Rash       MEDICATIONS:  Current Outpatient Medications   Medication Sig Dispense Refill    Glucagon (GVOKE HYPOPEN 2-PACK) 1 MG/0.2ML SOAJ Inject 1 mg into the skin as needed (hypoglycemia) Inject 1mg under the skin as needed for low blood sugars. 1 each 1    insulin lispro (HUMALOG) 100 UNIT/ML injection vial Per Insulin Pump- Max dose 20 units daily 10 mL 5     No current facility-administered medications for this visit. Review ofSymptoms:  Review of Systems   Constitutional: Positive for fatigue. Negative for unexpected weight change. Eyes: Negative for visual disturbance. Respiratory: Negative. Negative for shortness of breath. Cardiovascular: Negative for chest pain and leg swelling. Gastrointestinal: Negative for abdominal pain and diarrhea. Endocrine: Negative for polydipsia, polyphagia and polyuria. Genitourinary: Negative. Musculoskeletal: Negative. Skin: Negative for rash and wound. Neurological: Negative for dizziness, tremors, seizures and headaches. Psychiatric/Behavioral: Negative. Negative for confusion and decreased concentration. Theremainder of a complete 14-point review of systems is negative.        Vital Signs: BP 120/60 (Site: Right Upper Arm, Position: Sitting, Cuff Size: Medium Adult)   Pulse 80   Resp 16   Ht 5' 1\" (1.549 m)   Wt 112 lb (50.8 kg)   LMP 02/23/2022 (Exact Date)   Breastfeeding No   BMI 21.16 kg/m²      Wt Readings from Last 3 Encounters:   02/23/22 112 lb (50.8 kg)   12/30/21 112 lb (50.8 kg)   11/11/21 114 lb (51.7 kg)     Body mass index is 21.16 kg/m². LABS:  Hemoglobin A1C   Date Value Ref Range Status   03/02/2021 8.3 % Final   05/18/2017 6.5 % Final     Lab Results   Component Value Date    LABMICR CANNOT BE CALCULATED 09/05/2014     Lab Results   Component Value Date     10/08/2014    K 4.2 10/08/2014     10/08/2014    CO2 25 10/08/2014    BUN 10 10/08/2014    CREATININE 0.55 10/08/2014    GLUCOSE 133 (H) 12/09/2014    CALCIUM 9.2 10/08/2014    PROT 6.9 09/05/2014    LABALBU 4.5 09/05/2014    BILITOT 0.28 (L) 09/05/2014    ALKPHOS 52 09/05/2014    AST 15 09/05/2014    ALT 10 09/05/2014    LABGLOM >60 10/08/2014    GFRAA >60 10/08/2014     Lab Results   Component Value Date    CHOL 180 05/18/2017    CHOL 173 09/05/2014     Lab Results   Component Value Date    TRIG 51 05/18/2017    TRIG 55 09/05/2014     Lab Results   Component Value Date    HDL 61 05/18/2017    HDL 50 09/05/2014     Lab Results   Component Value Date    LDLCHOLESTEROL 112 09/05/2014    LDLCALC 109 05/18/2017     Lab Results   Component Value Date    VLDL 10 05/18/2017    VLDL 11 09/05/2014     Lab Results   Component Value Date    CHOLHDLRATIO 3.0 05/18/2017    CHOLHDLRATIO 3.5 09/05/2014           Physical Exam  Constitutional:       Appearance: She is well-developed. Eyes:      Pupils: Pupils are equal, round, and reactive to light. Neck:      Thyroid: No thyroid mass, thyromegaly or thyroid tenderness. Cardiovascular:      Rate and Rhythm: Normal rate and regular rhythm. Heart sounds: Normal heart sounds. Pulmonary:      Effort: Pulmonary effort is normal.      Breath sounds: Normal breath sounds. Abdominal:      General: Bowel sounds are normal.      Palpations: Abdomen is soft. Skin:     General: Skin is warm and dry. Comments: Negative for open/nonhealing wounds. Negative for lipohypertrophy. Neurological:      Mental Status: She is alert and oriented to person, place, and time. ASSESSMENT/PLAN:     Diagnosis Orders   1. Type 1 diabetes mellitus without complication (HCC)  Basic Metabolic Panel    Hemoglobin A1C    Microalbumin, Ur    Lipid Panel    TSH With Reflex Ft4    Glucagon (GVOKE HYPOPEN 2-PACK) 1 MG/0.2ML SOAJ    insulin lispro (HUMALOG) 100 UNIT/ML injection vial     Orders Placed This Encounter   Procedures    Basic Metabolic Panel    Hemoglobin A1C    Microalbumin, Ur    Lipid Panel    TSH With Reflex Ft4     Orders Placed This Encounter   Medications    Glucagon (GVOKE HYPOPEN 2-PACK) 1 MG/0.2ML SOAJ     Sig: Inject 1 mg into the skin as needed (hypoglycemia) Inject 1mg under the skin as needed for low blood sugars. Dispense:  1 each     Refill:  1    insulin lispro (HUMALOG) 100 UNIT/ML injection vial     Sig: Per Insulin Pump- Max dose 20 units daily     Dispense:  10 mL     Refill:  5     Requested Prescriptions     Signed Prescriptions Disp Refills    Glucagon (GVOKE HYPOPEN 2-PACK) 1 MG/0.2ML SOAJ 1 each 1     Sig: Inject 1 mg into the skin as needed (hypoglycemia) Inject 1mg under the skin as needed for low blood sugars.  insulin lispro (HUMALOG) 100 UNIT/ML injection vial 10 mL 5     Sig: Per Insulin Pump- Max dose 20 units daily       1. Type 1 diabetes mellitus without complication (HCC)  - Unstable  HbA1C goal is less than 7%. - Fasting blood glucose goal is 70-120mg/dl and postprandial blood sugar goal is less than 180 mg/dl. - Labs reviewed including most recent A1c, microalbumin and kidney function. Repeat labs due in 1 week.     -We discussed in great detail dietary modifications they can make to better improve their blood sugars. --Initial diabetic education completed. Discussed diabetes as a disease and how we can manage it to prevent complications associated with it. Insulin pump settings downloaded and reviewed. Scanned into media tab. Patient Instructions   Labs due now  Make eye appointment   Restart dexcom   Fill reservoir with 100 units   Change site every 3 days         Discussed signs and symptoms of hyper/hypoglycemia and how to treat. Encouraged 150 minutes of physical activity per week. Follow a low carbohydrate diet. Encouraged at least 7 hours of sleep. The patient was informed of the goals of diabetes management. This can only be accomplished by watching their diet and exercise levels. We certainly use medicines to help attain these goals. The consequences of not controlling blood sugars were discussed. These include blindness, heart disease, stroke, kidney disease, and possibly need for dialysis. They were told to be careful with their foot care as diabetics often have nerve damage, infections and risk for limb amutations . They also need a dilated eye exam yearly. We discussed the issues of diet, exercise, medication, complication avoidance, reviewed the signs and symptoms of diabetes, hypoglycemic episodes, significance of HbA1C.       - Basic Metabolic Panel; Future  - Hemoglobin A1C; Future  - Microalbumin, Ur; Future  - Lipid Panel; Future  - TSH With Reflex Ft4; Future  - Glucagon (GVOKE HYPOPEN 2-PACK) 1 MG/0.2ML SOAJ; Inject 1 mg into the skin as needed (hypoglycemia) Inject 1mg under the skin as needed for low blood sugars. Dispense: 1 each; Refill: 1  - insulin lispro (HUMALOG) 100 UNIT/ML injection vial; Per Insulin Pump- Max dose 20 units daily  Dispense: 10 mL; Refill: 5        Answered all patient questions. Agrees to follow plan of care and to follow up in 1 months, sooner if needed. Call office if unexplained blood sugars less than 70 occur or above 400.  Call office or access [employer] with any further questions or concerns. Be sure to bring glucometer/food log at next appointment. Total time spent reviewing chart, labs, counseling patient and documenting on the date of the encounter: 60 min.       Electronically signed by CONY Angulo CNP on 2/23/2022 at 5:09 PM      (Please note that portions of this note were completed with a voice-recognition program. Efforts were made to edit the dictation but occasionally words are mis-transcribed.)

## 2022-03-02 ENCOUNTER — TELEPHONE (OUTPATIENT)
Dept: INTERNAL MEDICINE | Age: 27
End: 2022-03-02

## 2022-03-02 NOTE — TELEPHONE ENCOUNTER
Unable to approve patients gvoke hypopen. Did notify patient that the glucagon tablets were cheap otc until we get something approved.

## 2022-03-25 NOTE — DISCHARGE SUMMARY
Outpatient Occupational Therapy    [] [place]  Phone: [phone]  Fax: [phone]      [] [place]  Phone: [phone]  Fax: [phone]    Occupational Therapy Discharge Note  Date: 3/25/2022        Patient Name:  Maura Hill    :  1995  MRN: [id number]  Restrictions/Precautions:    Diagnosis Information:  Diagnosis: Radial tunnel syndrome of right upper extremity       Insurance/Certification information:     Physician Information:  Referring Practitioner: Kayla Sarkar DO  Plan of care signed (Y/N): y  Visit# / total visits:  1  Pain level: 0/10     Time Period for Report:  22  Cancels/No-shows to date:  0    Significant Findings At Last Visit/Comments:    Subjective:  Subjective: Pt rec'd in [facility] room, pleasant and cooperative for OT. Pt is a 32 yo female referred to OT for radial tunnel syndrome. General Comment    Comments: Pt reports that she first began to notice symptoms a year and a half ago when she was working at a different job. Symptoms have come and gone in that timeframe, but have subsided since she started at a new job. 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 Pt was issued a wrist brace from the doctor and wore it at night for about 2 weeks when she had symtoms Pt reports that it helped, but she is no longer having the symptoms.         Objective:  AROM - LUE  L Elbow Flexion 0-145: 140°  L Elbow Extension 145-0: 0°  LUE AROM (degrees)  LUE AROM : WNL  L Elbow Flexion 0-145: 140°  L Elbow Extension 145-0: 0°  L Forearm Pron 0-90: 90°  L Forearm Supination  0-90: 90°  L Wrist Flexion 0-80: 90°  RUE AROM (degrees)  RUE AROM : WNL  R Elbow Flexion 0-145: 140°  R Elbow Extension 145-0: 0°  R Forearm Pron 0-90: 90°  R Forearm Supination  0-90: 90°  R Wrist Flexion 0-80: 93°  AROM - RUE  R Elbow Flexion 0-145: 140°  R Elbow Extension 145-0: 0°  R Wrist Flexion 0-80: 93°  LUE Strength  Gross LUE Strength: WNL  L Shoulder Flex: 5/5  L Shoulder Ext: 5/5  L Shoulder ABduction: 5/5  L Shoulder ADduction: 5/5  L Elbow Flex: 5/5  L Elbow Ext: 5/5  L Forearm Pron: 5/5  L Forearm Sup: 5/5  RUE Strength  Gross RUE Strength: WNL  R Shoulder Flex: 5/5  R Shoulder Ext: 5/5  R Shoulder ABduction: 5/5  R Shoulder ADduction: 5/5  R Elbow Flex: 5/5  R Elbow Ext: 5/5  R Forearm Pron: 5/5  R Forearm Sup: 5/5     Hand Dominance  Hand Dominance: Right  Left Hand Strength -  (lbs)  Handle Setting 2: 79 (Avg= 63.5)  LUE Edema - Circumference (cm)  LUE Edema Present?: No  Left 9-Hole Peg Test  Left 9-Hole Peg Test: Not Tested  Right Hand Strength -  (lbs)  Handle Setting 2: 80 (Avg= 74.5)  RUE Edema - Circumference (cm)  RUE Edema Present?: No  Right 9-Hole Peg Test  Right 9-Hole Peg Test: Not Tested  Fine Motor Skills  Left 9-Hole Peg Test: Not Tested  Right 9-Hole Peg Test: Not Tested  Other Assessment: Phalen's test- negative;  Long finger test- negative; Mill's test- negative  Hand Assessment Comment  Hand Assessment Comment: Upper Extremity Functional Index: 80/80, indicating 0% level of impairment with daily tasks     Assessment:  Assessment  Treatment Diagnosis: Radial Tunnel Syndrome  Prognosis: Good  Decision Making: Low Complexity  REQUIRES OT FOLLOW UP: No      Goals/Progress towards goals:    Short term goals  Time Frame for Short term goals: 1/21/2022  Short term goal 1: Provide patient education for HEP to help reduce symptoms of tingling and numbness in RUE -GOAL MET  Short term goal 2: Provide patient education for modifications for liftin/carrying objects to reduce symtpoms of tingling and numbness -GOAL MET    Percentage of Goals Met:    100%     Discharge Prognosis: [x] Excellent [] Good [] Fair  [] Poor       Goal Status:  [x] Achieved [] Partially Achieved  [] Not Achieved     Patient Status:     [x] Patient now discharged              Electronically signed by:  DAV Li, OTR/L

## 2022-03-30 ENCOUNTER — OFFICE VISIT (OUTPATIENT)
Dept: DIABETES SERVICES | Age: 27
End: 2022-03-30
Payer: COMMERCIAL

## 2022-03-30 ENCOUNTER — HOSPITAL ENCOUNTER (OUTPATIENT)
Dept: LAB | Age: 27
Discharge: HOME | End: 2022-03-30
Payer: COMMERCIAL

## 2022-03-30 VITALS
HEIGHT: 61 IN | WEIGHT: 114 LBS | SYSTOLIC BLOOD PRESSURE: 112 MMHG | HEART RATE: 84 BPM | DIASTOLIC BLOOD PRESSURE: 72 MMHG | RESPIRATION RATE: 14 BRPM | BODY MASS INDEX: 21.52 KG/M2

## 2022-03-30 DIAGNOSIS — E10.9 TYPE 1 DIABETES MELLITUS WITHOUT COMPLICATION (HCC): ICD-10-CM

## 2022-03-30 DIAGNOSIS — F31.9 BIPOLAR 1 DISORDER (HCC): ICD-10-CM

## 2022-03-30 DIAGNOSIS — E10.9 TYPE 1 DIABETES MELLITUS WITHOUT COMPLICATION (HCC): Primary | ICD-10-CM

## 2022-03-30 LAB
ANION GAP SERPL CALCULATED.3IONS-SCNC: 9 MMOL/L (ref 9–17)
BUN BLDV-MCNC: 18 MG/DL (ref 6–20)
BUN/CREAT BLD: 31 (ref 9–20)
CALCIUM SERPL-MCNC: 9.5 MG/DL (ref 8.6–10.4)
CHLORIDE BLD-SCNC: 102 MMOL/L (ref 98–107)
CO2: 29 MMOL/L (ref 20–31)
CREAT SERPL-MCNC: 0.58 MG/DL (ref 0.5–0.9)
CREATININE URINE: 128 MG/DL (ref 28–217)
ESTIMATED AVERAGE GLUCOSE: 192 MG/DL
GFR AFRICAN AMERICAN: >60 ML/MIN
GFR NON-AFRICAN AMERICAN: >60 ML/MIN
GFR SERPL CREATININE-BSD FRML MDRD: ABNORMAL ML/MIN/{1.73_M2}
GLUCOSE BLD-MCNC: 91 MG/DL (ref 70–99)
HBA1C MFR BLD: 8.3 % (ref 4–6)
MICROALBUMIN/CREAT 24H UR: 29 MG/L
MICROALBUMIN/CREAT UR-RTO: 23 MCG/MG CREAT
POTASSIUM SERPL-SCNC: 4.6 MMOL/L (ref 3.7–5.3)
SODIUM BLD-SCNC: 140 MMOL/L (ref 135–144)
TSH SERPL DL<=0.05 MIU/L-ACNC: 0.27 UIU/ML (ref 0.3–5)

## 2022-03-30 PROCEDURE — 80048 BASIC METABOLIC PNL TOTAL CA: CPT

## 2022-03-30 PROCEDURE — 95251 CONT GLUC MNTR ANALYSIS I&R: CPT | Performed by: NURSE PRACTITIONER

## 2022-03-30 PROCEDURE — 99214 OFFICE O/P EST MOD 30 MIN: CPT | Performed by: NURSE PRACTITIONER

## 2022-03-30 PROCEDURE — 84443 ASSAY THYROID STIM HORMONE: CPT

## 2022-03-30 PROCEDURE — 36415 COLL VENOUS BLD VENIPUNCTURE: CPT

## 2022-03-30 PROCEDURE — 82570 ASSAY OF URINE CREATININE: CPT

## 2022-03-30 PROCEDURE — 82043 UR ALBUMIN QUANTITATIVE: CPT

## 2022-03-30 PROCEDURE — 83036 HEMOGLOBIN GLYCOSYLATED A1C: CPT

## 2022-03-30 PROCEDURE — 80061 LIPID PANEL: CPT

## 2022-03-30 PROCEDURE — 99214 OFFICE O/P EST MOD 30 MIN: CPT

## 2022-03-30 PROCEDURE — 84439 ASSAY OF FREE THYROXINE: CPT

## 2022-03-30 PROCEDURE — 3052F HG A1C>EQUAL 8.0%<EQUAL 9.0%: CPT | Performed by: NURSE PRACTITIONER

## 2022-03-30 NOTE — PROGRESS NOTES
[facility] [place] [facility] INTERNAL [facility] [address] [facility]  [address] [facility], [address]  [place] [address] [facility]  [phone]        HISTORY:    Nico Pastor presents today for evaluation and management of:  Chief Complaint   Patient presents with    Diabetes     1 month appt. Will make goal for eye appt. HPI    Interval History:      Current Diabetic Medications  humalog for use in tslim insulin pump TDD 20 units     DKA episodes: age 24 with diagnosis    02/23/22   Nico Pastor is a 31 y.o. female patient who presents to clinic today forher diabetes. she denies any signs or symptoms of hyper/hypoglycemia. she states they are taking their medications as prescribed without any adverse effects. She was diagnosed at age 24 with DKA type 1 diabetes started on MDI transition to insulin pump shortly after. She is previously seen by endocrinology however was not able to get her supplies and would like to switch and get established for diabetes management. She does admit that her mental health makes it difficult for her to manage her diabetes. She does see a counselor online. She has a history of bad reactions with depression medications and at this time is resistant to try new medication. Diet: Counting carbohydrate  Exercise: None  BS testing: Occasionally does have a Dexcom and supplies. Issues: Denies  Diabetic foot exam up-to-date: No  Diabetic retinal exam up-to-date: No  Hypoglycemia as needed treatment: snack, apple juice, glucose tabs and glucose juice. Has urine ketone strips. 03/31/22   Nico Pastor is a 31 y.o. female patient who presents to clinic today for her diabetes. she has a history of non compliance which contributes to her diabetes. At previous visit diabetes counseling was provided. she denies any current signs or symptoms of hyper/hypoglycemia.  she states they are taking their medications as prescribed without any adverse effects. Diet: Counting carbohydrate  Exercise: None  BS testing: Occasionally does have a Dexcom and supplies. Issues: Denies  Diabetic foot exam up-to-date: No  Diabetic retinal exam up-to-date: No  Hypoglycemia as needed treatment: snack, apple juice, glucose tabs and glucose juice. Has urine ketone strips. Depression/ anxiety  - she has a history of depression, anxiety and bipolar. She was tired on many medications in the past and has many side effects. She is currently not taking any menta health meds but is interested in talking with someone. She feels that her fluctuations in glucose levels really affect her mood and cause added stress. Denies any suicidal or self harm thoughts.        Past Medical History:   Diagnosis Date    Allergic rhinitis     Depression     Used to take citalopram, but it caused allergic reaction    [facility]     diagnosed at age 24 after influenza infection    Dysmenorrhea     with heavy menses    Hyperlipidemia      Family History   Problem Relation Age of Onset    Inflam Bowel Dis Mother     Other Mother         GERD    Diabetes Paternal Grandfather         type 1     Social History     Tobacco Use    Smoking status: Former Smoker     Packs/day: 0.10     Types: Cigarettes     Start date: 2021     Quit date: 2021     Years since quittin.3    Smokeless tobacco: Former User     Quit date: 2021    Tobacco comment: Never was a consistant smoker, very light occasional smoker   Vaping Use    Vaping Use: Never used   Substance Use Topics    Alcohol use: No    Drug use: Yes     Frequency: 7.0 times per week     Types: Marijuana (Weed)     Comment: smokes one per day     Allergies   Allergen Reactions    Other Hives and Swelling     Shrimp- Swelling around mouth    Citalopram Rash       MEDICATIONS:  Current Outpatient Medications   Medication Sig Dispense Refill    insulin lispro (HUMALOG) 100 UNIT/ML injection vial Per Insulin BILITOT 0.28 (L) 09/05/2014    ALKPHOS 52 09/05/2014    AST 15 09/05/2014    ALT 10 09/05/2014    LABGLOM >60 03/30/2022    GFRAA >60 03/30/2022     Lab Results   Component Value Date    CHOL 167 03/30/2022    CHOL 180 05/18/2017    CHOL 173 09/05/2014     Lab Results   Component Value Date    TRIG 32 03/30/2022    TRIG 51 05/18/2017    TRIG 55 09/05/2014     Lab Results   Component Value Date    HDL 72 03/30/2022    HDL 61 05/18/2017    HDL 50 09/05/2014     Lab Results   Component Value Date    LDLCHOLESTEROL 89 03/30/2022    LDLCHOLESTEROL 112 09/05/2014    LDLCALC 109 05/18/2017     Lab Results   Component Value Date    VLDL 10 05/18/2017    VLDL 11 09/05/2014     Lab Results   Component Value Date    CHOLHDLRATIO 2.3 03/30/2022    CHOLHDLRATIO 3.0 05/18/2017    CHOLHDLRATIO 3.5 09/05/2014           Physical Exam  Constitutional:       Appearance: She is well-developed. Eyes:      Pupils: Pupils are equal, round, and reactive to light. Neck:      Thyroid: No thyroid mass, thyromegaly or thyroid tenderness. Cardiovascular:      Rate and Rhythm: Normal rate and regular rhythm. Heart sounds: Normal heart sounds. Pulmonary:      Effort: Pulmonary effort is normal.      Breath sounds: Normal breath sounds. Abdominal:      General: Bowel sounds are normal.      Palpations: Abdomen is soft. Skin:     General: Skin is warm and dry. Comments: Negative for open/nonhealing wounds. Negative for lipohypertrophy. Neurological:      Mental Status: She is alert and oriented to person, place, and time. ASSESSMENT/PLAN:     Diagnosis Orders   1. Type 1 diabetes mellitus without complication [facility])  Ambulatory referral to Behavioral Health   2. Bipolar 1 disorder [facility])  Ambulatory referral to [address] [facility] [place] This Encounter   Procedures    Ambulatory referral to [facility]     No orders of the defined types were placed in this encounter.     Requested Prescriptions No prescriptions requested or ordered in this encounter       1. Type 1 diabetes mellitus without complication (HCC)  - Unstable  HbA1C goal is less than 7%. - Fasting blood glucose goal is 70-120mg/dl and postprandial blood sugar goal is less than 180 mg/dl. - Labs reviewed including most recent A1c, microalbumin and kidney function. Repeat labs due in 3 months.    -We discussed in great detail dietary modifications they can make to better improve their blood sugars. -follow up diabetes education completed, all questions answered. CGM report downloaded and reviewed, scanned to media tab. Time in range 23% and hypoglycemia 1%. Average glucose 323 mg/dl  Insulin pump settings downloaded and reviewed. Scanned into media tab. -will work on compliance and getting pump upgraded.   -enter in glucose, use temp basal when physically active, enter in correct carb count    Discussed signs and symptoms of hyper/hypoglycemia and how to treat. Encouraged 150 minutes of physical activity per week. Follow a low carbohydrate diet. Encouraged at least 7 hours of sleep. The patient was informed of the goals of diabetes management. This can only be accomplished by watching their diet and exercise levels. We certainly use medicines to help attain these goals. The consequences of not controlling blood sugars were discussed. These include blindness, heart disease, stroke, kidney disease, and possibly need for dialysis. They were told to be careful with their foot care as diabetics often have nerve damage, infections and risk for limb amutations . They also need a dilated eye exam yearly. We discussed the issues of diet, exercise, medication, complication avoidance, reviewed the signs and symptoms of diabetes, hypoglycemic episodes, significance of HbA1C.       - Ambulatory referral to [facility]    2. B[facility] disorder ([facility]  -will look into order genetic testing for depression medication support.    - Ambulatory referral to [address] all patient questions. Agrees to follow plan of care and to follow up in 1 months, sooner if needed. Call office if unexplained blood sugars less than 70 occur or above 400. Call office or access MyChart with any further questions or concerns. Be sure to bring glucometer/food log at next appointment. Total time spent reviewing chart, labs, counseling patient and documenting on the date of the encounter: 30 min.       Electronically signed by CONY Savage CNP on 3/31/2022 at 8:24 AM      (Please note that portions of this note were completed with a voice-recognition program. Efforts were made to edit the dictation but occasionally words are mis-transcribed.)

## 2022-03-31 DIAGNOSIS — R79.89 LOW TSH LEVEL: Primary | ICD-10-CM

## 2022-03-31 DIAGNOSIS — E10.9 TYPE 1 DIABETES MELLITUS WITHOUT COMPLICATION (HCC): ICD-10-CM

## 2022-03-31 LAB
CHOLESTEROL/HDL RATIO: 2.3
CHOLESTEROL: 167 MG/DL
HDLC SERPL-MCNC: 72 MG/DL
LDL CHOLESTEROL: 89 MG/DL (ref 0–130)
THYROXINE, FREE: 1.12 NG/DL (ref 0.93–1.7)
TRIGL SERPL-MCNC: 32 MG/DL

## 2022-03-31 ASSESSMENT — ENCOUNTER SYMPTOMS
RESPIRATORY NEGATIVE: 1
DIARRHEA: 0
SHORTNESS OF BREATH: 0
ABDOMINAL PAIN: 0

## 2022-04-04 ENCOUNTER — TELEPHONE (OUTPATIENT)
Dept: INTERNAL MEDICINE | Age: 27
End: 2022-04-04

## 2022-04-04 NOTE — TELEPHONE ENCOUNTER
Please let pt know I am working on ordering the genetic test for depression/anxiety medications. I need to document what medications she has tried in the past and doses if she remembers. Once it is ordered the test kit will be mailed to her and she will follow the steps for a cheek swab and mail it back. Insurance will be billed and they will call if the cost will be greater than $330. It looks like the cost will be $100, however there are options if this is not feasible and will be based on income sliding scale.

## 2022-04-05 NOTE — TELEPHONE ENCOUNTER
Spoke with patient. She states that she was taking celexa and had an adverse reaction to medication and then also had taken Lamictal for a short period of time. She states she cant remember the dose off hand but thinks it was \"the second to lowest dose\". She did stated that she also was on another medication similar to celexa in high school but can not remember the name of it. Does understand instructions and possible costs. She also asked if her thyroid is abnormal still if she should be getting any other type of hormonal labs completed.

## 2022-04-05 NOTE — TELEPHONE ENCOUNTER
Please let pt know genetic testing has been ordered and she should expect it in the mail. Please follow instructions and call office with any questions. If thyroid is still abnormal we will refer to hermila for next steps on labs.

## 2022-05-02 ENCOUNTER — TELEPHONE (OUTPATIENT)
Dept: INTERNAL MEDICINE | Age: 27
End: 2022-05-02

## 2022-05-02 NOTE — TELEPHONE ENCOUNTER
Attempted to reach patient to see if she would like to reschedule her missed appt with Dr. Geovanni Ellis. No answer and mailbox is full.

## 2022-05-12 ENCOUNTER — HOSPITAL ENCOUNTER (OUTPATIENT)
Dept: LAB | Age: 27
Discharge: HOME | End: 2022-05-12
Payer: COMMERCIAL

## 2022-05-12 DIAGNOSIS — R79.89 LOW TSH LEVEL: ICD-10-CM

## 2022-05-12 LAB — TSH SERPL DL<=0.05 MIU/L-ACNC: 0.44 UIU/ML (ref 0.3–5)

## 2022-05-12 PROCEDURE — 84443 ASSAY THYROID STIM HORMONE: CPT

## 2022-05-12 PROCEDURE — 36415 COLL VENOUS BLD VENIPUNCTURE: CPT

## 2022-06-29 ENCOUNTER — OFFICE VISIT (OUTPATIENT)
Dept: DIABETES SERVICES | Age: 27
End: 2022-06-29
Payer: COMMERCIAL

## 2022-06-29 VITALS
SYSTOLIC BLOOD PRESSURE: 98 MMHG | HEART RATE: 88 BPM | BODY MASS INDEX: 22.66 KG/M2 | WEIGHT: 120 LBS | DIASTOLIC BLOOD PRESSURE: 64 MMHG | RESPIRATION RATE: 16 BRPM | HEIGHT: 61 IN

## 2022-06-29 DIAGNOSIS — E10.9 TYPE 1 DIABETES MELLITUS WITHOUT COMPLICATION (HCC): Primary | ICD-10-CM

## 2022-06-29 PROCEDURE — 95251 CONT GLUC MNTR ANALYSIS I&R: CPT | Performed by: NURSE PRACTITIONER

## 2022-06-29 PROCEDURE — 99214 OFFICE O/P EST MOD 30 MIN: CPT | Performed by: NURSE PRACTITIONER

## 2022-06-29 PROCEDURE — 3052F HG A1C>EQUAL 8.0%<EQUAL 9.0%: CPT | Performed by: NURSE PRACTITIONER

## 2022-06-29 RX ORDER — INSULIN ASPART 100 [IU]/ML
INJECTION, SOLUTION INTRAVENOUS; SUBCUTANEOUS
Qty: 10 ML | Refills: 3 | Status: SHIPPED | OUTPATIENT
Start: 2022-06-29 | End: 2022-10-05 | Stop reason: SDUPTHER

## 2022-06-29 RX ORDER — INSULIN ASPART 100 [IU]/ML
INJECTION, SOLUTION INTRAVENOUS; SUBCUTANEOUS 3 TIMES DAILY
COMMUNITY
End: 2022-06-29 | Stop reason: SDUPTHER

## 2022-06-30 ASSESSMENT — ENCOUNTER SYMPTOMS
SHORTNESS OF BREATH: 0
RESPIRATORY NEGATIVE: 1
ABDOMINAL PAIN: 0
DIARRHEA: 0

## 2022-06-30 NOTE — PROGRESS NOTES
Has urine ketone strips.           Past Medical History:   Diagnosis Date    Allergic rhinitis     Depression     Used to take citalopram, but it caused allergic reaction    Diabetes mellitus type 1 ([address]     diagnosed at age 24 after influenza infection    Dysmenorrhea     with heavy menses    Hyperlipidemia      Family History   Problem Relation Age of Onset    Inflam Bowel Dis Mother     Other Mother         GERD    Diabetes Paternal Grandfather         type 1     Social History     Tobacco Use    Smoking status: Former Smoker     Packs/day: 0.10     Types: Cigarettes     Start date: 2021     Quit date: 2021     Years since quittin.5    Smokeless tobacco: Former User     Quit date: 2021    Tobacco comment: Never was a consistant smoker, very light occasional smoker   Vaping Use    Vaping Use: Never used   Substance Use Topics    Alcohol use: No    Drug use: Yes     Frequency: 7.0 times per week     Types: Marijuana (Weed)     Comment: smokes one per day     Allergies   Allergen Reactions    Other Hives and Swelling     Shrimp- Swelling around mouth    Citalopram Rash       MEDICATIONS:  Current Outpatient Medications   Medication Sig Dispense Refill    insulin aspart (NOVOLOG) 100 UNIT/ML injection vial Per insulin pump- max dose 30 units per day 10 mL 3    Insulin Infusion Pump Supplies MISC Change infusion site every 2 days 15 each 3    Glucagon (GVOKE HYPOPEN 2-PACK) 1 MG/0.2ML SOAJ Inject 1 mg into the skin as needed (hypoglycemia) Inject 1mg under the skin as needed for low blood sugars. (Patient not taking: Reported on 2022) 1 each 1     No current facility-administered medications for this visit. Review ofSymptoms:  Review of Systems   Constitutional: Positive for fatigue. Negative for unexpected weight change. Eyes: Negative for visual disturbance. Respiratory: Negative. Negative for shortness of breath.     Cardiovascular: Negative for chest pain and leg swelling. Gastrointestinal: Negative for abdominal pain and diarrhea. Endocrine: Negative for polydipsia, polyphagia and polyuria. Genitourinary: Negative. Musculoskeletal: Negative. Skin: Negative for rash and wound. Neurological: Negative for dizziness, tremors, seizures and headaches. Psychiatric/Behavioral: Negative. Negative for confusion and decreased concentration. Theremainder of a complete 14-point review of systems is negative. Vital Signs: BP 98/64 (Site: Left Upper Arm, Position: Sitting, Cuff Size: Medium Adult)   Pulse 88   Resp 16   Ht 5' 1\" (1.549 m)   Wt 120 lb (54.4 kg)   LMP 06/22/2022 (Exact Date)   Breastfeeding No   BMI 22.67 kg/m²      Wt Readings from Last 3 Encounters:   06/29/22 120 lb (54.4 kg)   03/30/22 114 lb (51.7 kg)   02/23/22 112 lb (50.8 kg)     Body mass index is 22.67 kg/m².   LABS:  Hemoglobin A1C   Date Value Ref Range Status   03/30/2022 8.3 (H) 4.0 - 6.0 % Final   03/02/2021 8.3 % Final     Lab Results   Component Value Date    LABMICR 23 03/30/2022     Lab Results   Component Value Date     03/30/2022    K 4.6 03/30/2022     03/30/2022    CO2 29 03/30/2022    BUN 18 03/30/2022    CREATININE 0.58 03/30/2022    GLUCOSE 91 03/30/2022    CALCIUM 9.5 03/30/2022    PROT 6.9 09/05/2014    LABALBU 4.5 09/05/2014    BILITOT 0.28 (L) 09/05/2014    ALKPHOS 52 09/05/2014    AST 15 09/05/2014    ALT 10 09/05/2014    LABGLOM >60 03/30/2022    GFRAA >60 03/30/2022     Lab Results   Component Value Date    CHOL 167 03/30/2022    CHOL 180 05/18/2017    CHOL 173 09/05/2014     Lab Results   Component Value Date    TRIG 32 03/30/2022    TRIG 51 05/18/2017    TRIG 55 09/05/2014     Lab Results   Component Value Date    HDL 72 03/30/2022    HDL 61 05/18/2017    HDL 50 09/05/2014     Lab Results   Component Value Date    LDLCHOLESTEROL 89 03/30/2022    LDLCHOLESTEROL 112 09/05/2014    LDLCALC 109 05/18/2017     Lab Results   Component Value Date VLDL 10 05/18/2017    VLDL 11 09/05/2014     Lab Results   Component Value Date    ANGELINA 2.3 03/30/2022    CHOLKAYYO 3.0 05/18/2017    CHOLHDLRKEYONNAO 3.5 09/05/2014           Physical Exam  Constitutional:       Appearance: She is well-developed. Eyes:      Pupils: Pupils are equal, round, and reactive to light. Neck:      Thyroid: No thyroid mass, thyromegaly or thyroid tenderness. Cardiovascular:      Rate and Rhythm: Normal rate and regular rhythm. Heart sounds: Normal heart sounds. Pulmonary:      Effort: Pulmonary effort is normal.      Breath sounds: Normal breath sounds. Abdominal:      General: Bowel sounds are normal.      Palpations: Abdomen is soft. Skin:     General: Skin is warm and dry. Comments: Negative for open/nonhealing wounds. Negative for lipohypertrophy. Neurological:      Mental Status: She is alert and oriented to person, place, and time. ASSESSMENT/PLAN:     Diagnosis Orders   1. Type 1 diabetes mellitus without complication (HCC)  insulin aspart (NOVOLOG) 100 UNIT/ML injection vial     No orders of the defined types were placed in this encounter. Orders Placed This Encounter   Medications    insulin aspart (NOVOLOG) 100 UNIT/ML injection vial     Sig: Per insulin pump- max dose 30 units per day     Dispense:  10 mL     Refill:  3     Requested Prescriptions     Signed Prescriptions Disp Refills    insulin aspart (NOVOLOG) 100 UNIT/ML injection vial 10 mL 3     Sig: Per insulin pump- max dose 30 units per day       1. Type 1 diabetes mellitus without complication (HCC)  - Stable  HbA1C goal is less than 7%. - Fasting blood glucose goal is 70-120mg/dl and postprandial blood sugar goal is less than 180 mg/dl. - Labs reviewed including most recent A1c, microalbumin and kidney function. Repeat labs due in 1 week. -We discussed in great detail dietary modifications they can make to better improve their blood sugars.   -follow up diabetes education completed, all questions answered. Labs are due now  CGM report downloaded and reviewed, scanned to media tab. Time in range 77% and hypoglycemia 0%. Average glucose 139 mg/dl  Insulin pump settings downloaded and reviewed. Scanned into media tab. -not using control IQ, discussed its benefits. Suggested using sleep mode during the day to omit auto bolus.   -she can also try split dose bolus or bolus right after meals. Discussed signs and symptoms of hyper/hypoglycemia and how to treat. Encouraged 150 minutes of physical activity per week. Follow a low carbohydrate diet. Encouraged at least 7 hours of sleep. The patient was informed of the goals of diabetes management. This can only be accomplished by watching their diet and exercise levels. We certainly use medicines to help attain these goals. The consequences of not controlling blood sugars were discussed. These include blindness, heart disease, stroke, kidney disease, and possibly need for dialysis. They were told to be careful with their foot care as diabetics often have nerve damage, infections and risk for limb amutations . They also need a dilated eye exam yearly. We discussed the issues of diet, exercise, medication, complication avoidance, reviewed the signs and symptoms of diabetes, hypoglycemic episodes, significance of HbA1C.       - insulin aspart (NOVOLOG) 100 UNIT/ML injection vial; Per insulin pump- max dose 30 units per day  Dispense: 10 mL; Refill: 3        Answered all patient questions. Agrees to follow plan of care and to follow up in 3 months, sooner if needed. Call office if unexplained blood sugars less than 70 occur or above 400. Call office or access MyChart with any further questions or concerns. Be sure to bring glucometer/food log at next appointment. Total time spent reviewing chart, labs, counseling patient and documenting on the date of the encounter: 30 min.       Electronically signed by CONY Barker CNP on 6/30/2022 at 7:47 AM      (Please note that portions of this note were completed with a voice-recognition program. Efforts were made to edit the dictation but occasionally words are mis-transcribed.)

## 2022-09-13 ENCOUNTER — TELEPHONE (OUTPATIENT)
Dept: INTERNAL MEDICINE | Age: 27
End: 2022-09-13

## 2022-09-13 DIAGNOSIS — E10.9 TYPE 1 DIABETES MELLITUS WITHOUT COMPLICATION (HCC): Primary | ICD-10-CM

## 2022-09-13 RX ORDER — BLOOD-GLUCOSE SENSOR
EACH MISCELLANEOUS
Qty: 3 EACH | Refills: 0 | Status: SHIPPED | OUTPATIENT
Start: 2022-09-13 | End: 2022-09-15 | Stop reason: SDUPTHER

## 2022-09-13 NOTE — TELEPHONE ENCOUNTER
Last appt: 12/30/2021  Next appt: Visit date not found    Patient called in saying that her Dexcom sensor stopped working last night and then fell off. She is going out of town tonight and was wondering if one could be sent to [facility].

## 2022-09-13 NOTE — TELEPHONE ENCOUNTER
Script sent in but insurance my not cover if she is not due for refill. She will need to also call dexcom for sensor replacement.

## 2022-09-15 DIAGNOSIS — E10.9 TYPE 1 DIABETES MELLITUS WITHOUT COMPLICATION (HCC): ICD-10-CM

## 2022-09-15 RX ORDER — BLOOD-GLUCOSE SENSOR
EACH MISCELLANEOUS
Qty: 3 EACH | Refills: 3 | Status: SHIPPED | OUTPATIENT
Start: 2022-09-15

## 2022-09-15 NOTE — TELEPHONE ENCOUNTER
Patient request dexcom sensors to be sent to [facility], they are cheaper for patient here. I attempted to call patient and make her aware that we can send these to [facility] since she was unsure, I was unable to leave a vm and mailbox was full.

## 2022-10-05 ENCOUNTER — OFFICE VISIT (OUTPATIENT)
Dept: DIABETES SERVICES | Age: 27
End: 2022-10-05
Payer: COMMERCIAL

## 2022-10-05 VITALS
DIASTOLIC BLOOD PRESSURE: 64 MMHG | HEART RATE: 88 BPM | SYSTOLIC BLOOD PRESSURE: 92 MMHG | HEIGHT: 61 IN | BODY MASS INDEX: 22.47 KG/M2 | WEIGHT: 119 LBS

## 2022-10-05 DIAGNOSIS — E10.9 TYPE 1 DIABETES MELLITUS WITHOUT COMPLICATION (HCC): Primary | ICD-10-CM

## 2022-10-05 DIAGNOSIS — B07.0 PLANTAR WART OF LEFT FOOT: ICD-10-CM

## 2022-10-05 PROCEDURE — 3052F HG A1C>EQUAL 8.0%<EQUAL 9.0%: CPT | Performed by: NURSE PRACTITIONER

## 2022-10-05 PROCEDURE — 95251 CONT GLUC MNTR ANALYSIS I&R: CPT | Performed by: NURSE PRACTITIONER

## 2022-10-05 PROCEDURE — 99214 OFFICE O/P EST MOD 30 MIN: CPT | Performed by: NURSE PRACTITIONER

## 2022-10-05 RX ORDER — INSULIN ASPART 100 [IU]/ML
INJECTION, SOLUTION INTRAVENOUS; SUBCUTANEOUS
Qty: 10 ML | Refills: 3 | Status: SHIPPED | OUTPATIENT
Start: 2022-10-05

## 2022-10-05 ASSESSMENT — ENCOUNTER SYMPTOMS
ABDOMINAL PAIN: 0
SHORTNESS OF BREATH: 0
RESPIRATORY NEGATIVE: 1
DIARRHEA: 0

## 2022-10-05 NOTE — PROGRESS NOTES
[address] [facility] DEPARTMENT OF [address]  [address] [facility], [address]  [facility] [address] [phone]        HISTORY:    Feroz Tobias presents today for evaluation and management of:  Chief Complaint   Patient presents with    Diabetes     Last appt 6/29/2022         Interval History:    Current Diabetic Medications  humalog for use in tslim insulin pump TDD 20 units     DKA episodes: age 24 with diagnosis       06/30/22   Feroz Tobias is a 31 y.o. female patient who presents to clinic today for her diabetes. she has a history of non-compliance and depression/anxiety which contributes to her diabetes. At previous visit insulin pump was upgraded to control IQ, she did use for a few weeks but then stopped due to not trusting the pump . she denies any current signs or symptoms of hyper/hypoglycemia. she states they are taking their medications as prescribed without any adverse effects. Diet: Counting carbohydrate  Exercise: cardio  BS testing: uses cgm daily with success and is adherent to cgm therapy  Issues: Denies  Diabetic foot exam up-to-date: No  Diabetic retinal exam up-to-date: No  Hypoglycemia as needed treatment: snack, apple juice, glucose tabs and glucose juice. Has urine ketone strips. 10/05/22   Feroz Tobias is a 28 y.o. female patient who presents to clinic today for her diabetes. she has a history of non-compliance and depression/anxiety which contributes to her diabetes. At previous visit diabetes counseling was provided. she denies any current signs or symptoms of hyper/hypoglycemia. she states they are taking their medications as prescribed without any adverse effects. She has been using control Iq more but still questions some of its adjustments.    Diet: Counting carbohydrate  Exercise: cardio  BS testing: uses cgm daily with success and is adherent to cgm therapy  Issues: Denies  Diabetic foot exam up-to-date: No  Diabetic retinal exam up-to-date: No  Hypoglycemia as needed treatment: snack, apple juice, glucose tabs and glucose juice. Has urine ketone strips. Past Medical History:   Diagnosis Date    Allergic rhinitis     Depression     Used to take citalopram, but it caused allergic reaction    Diabetes mellitus type 1 ([facility] [address]     diagnosed at age 24 after influenza infection    Dysmenorrhea     with heavy menses    Hyperlipidemia      Family History   Problem Relation Age of Onset    Inflam Bowel Dis Mother     Other Mother         GERD    Diabetes Paternal Grandfather         type 1     Social History     Tobacco Use    Smoking status: Former     Packs/day: 0.10     Types: Cigarettes     Start date: 2021     Quit date: 2021     Years since quittin.8    Smokeless tobacco: Former     Quit date: 2021    Tobacco comments:     Never was a consistant smoker, very light occasional smoker   Vaping Use    Vaping Use: Never used   Substance Use Topics    Alcohol use: No    Drug use: Yes     Frequency: 7.0 times per week     Types: Marijuana (Weed)     Comment: smokes one per day     Allergies   Allergen Reactions    Other Hives and Swelling     Shrimp- Swelling around mouth    Citalopram Rash       MEDICATIONS:  Current Outpatient Medications   Medication Sig Dispense Refill    insulin aspart (NOVOLOG) 100 UNIT/ML injection vial Per insulin pump- max dose 30 units per day 10 mL 3    Continuous Blood Gluc Sensor (DEXCOM G6 SENSOR) MISC Use daily to check blood sugars. Change every 10 days. 3 each 3    Insulin Infusion Pump Supplies MISC Change infusion site every 2 days 15 each 3    Glucagon (GVOKE HYPOPEN 2-PACK) 1 MG/0.2ML SOAJ Inject 1 mg into the skin as needed (hypoglycemia) Inject 1mg under the skin as needed for low blood sugars. (Patient not taking: Reported on 2022) 1 each 1     No current facility-administered medications for this visit.        Review ofSymptoms:  Review of Systems Constitutional:  Positive for fatigue. Negative for unexpected weight change. Eyes:  Negative for visual disturbance. Respiratory: Negative. Negative for shortness of breath. Cardiovascular:  Negative for chest pain and leg swelling. Gastrointestinal:  Negative for abdominal pain and diarrhea. Endocrine: Negative for polydipsia, polyphagia and polyuria. Genitourinary: Negative. Musculoskeletal: Negative. Skin:  Negative for rash and wound. Neurological:  Negative for dizziness, tremors, seizures and headaches. Psychiatric/Behavioral: Negative. Negative for confusion and decreased concentration. Theremainder of a complete 14-point review of systems is negative. Vital Signs: BP 92/64 (Site: Right Upper Arm, Position: Sitting, Cuff Size: Medium Adult)   Pulse 88   Ht 5' 1\" (1.549 m)   Wt 119 lb (54 kg)   LMP 10/05/2022   BMI 22.48 kg/m²      Wt Readings from Last 3 Encounters:   10/05/22 119 lb (54 kg)   06/29/22 120 lb (54.4 kg)   03/30/22 114 lb (51.7 kg)     Body mass index is 22.48 kg/m².   LABS:  Hemoglobin A1C   Date Value Ref Range Status   03/30/2022 8.3 (H) 4.0 - 6.0 % Final   03/02/2021 8.3 % Final     Lab Results   Component Value Date    LABMICR 23 03/30/2022     Lab Results   Component Value Date     03/30/2022    K 4.6 03/30/2022     03/30/2022    CO2 29 03/30/2022    BUN 18 03/30/2022    CREATININE 0.58 03/30/2022    GLUCOSE 91 03/30/2022    CALCIUM 9.5 03/30/2022    PROT 6.9 09/05/2014    LABALBU 4.5 09/05/2014    BILITOT 0.28 (L) 09/05/2014    ALKPHOS 52 09/05/2014    AST 15 09/05/2014    ALT 10 09/05/2014    LABGLOM >60 03/30/2022    GFRAA >60 03/30/2022     Lab Results   Component Value Date    CHOL 167 03/30/2022    CHOL 180 05/18/2017    CHOL 173 09/05/2014     Lab Results   Component Value Date    TRIG 32 03/30/2022    TRIG 51 05/18/2017    TRIG 55 09/05/2014     Lab Results   Component Value Date    HDL 72 03/30/2022    HDL 61 05/18/2017    HDL 50 09/05/2014     Lab Results   Component Value Date    LDLCHOLESTEROL 89 03/30/2022    LDLCHOLESTEROL 112 09/05/2014    LDLCALC 109 05/18/2017     Lab Results   Component Value Date    VLDL 10 05/18/2017    VLDL 11 09/05/2014     Lab Results   Component Value Date    CHOLHDLRATIO 2.3 03/30/2022    CHOLHDLRATIO 3.0 05/18/2017    CHOLHDLRATIO 3.5 09/05/2014           Physical Exam  Constitutional:       Appearance: She is well-developed. Eyes:      Pupils: Pupils are equal, round, and reactive to light. Neck:      Thyroid: No thyroid mass, thyromegaly or thyroid tenderness. Cardiovascular:      Rate and Rhythm: Normal rate and regular rhythm. Heart sounds: Normal heart sounds. Pulmonary:      Effort: Pulmonary effort is normal.      Breath sounds: Normal breath sounds. Abdominal:      General: Bowel sounds are normal.      Palpations: Abdomen is soft. Skin:     General: Skin is warm and dry. Comments: Negative for open/nonhealing wounds. Negative for lipohypertrophy. Neurological:      Mental Status: She is alert and oriented to person, place, and time. Diabetic foot exam:   Left Foot:   Visual Exam: normal   Pulse DP: 2+ (normal)   Filament test: normal sensation   Vibratory Sensation: normal  Right Foot:   Visual Exam: normal   Pulse DP: 2+ (normal)   Filament test: normal sensation   Vibratory Sensation: normal     ASSESSMENT/PLAN:     Diagnosis Orders   1.  Type 1 diabetes mellitus without complication (HCC)  insulin aspart (NOVOLOG) 100 UNIT/ML injection vial     DIABETES FOOT EXAM      2. Plantar wart of left foot  Ambulatory referral to [address] [place] [address] This Encounter   Procedures    Ambulatory referral to [address] [place] [address]       Orders Placed This Encounter   Medications    insulin aspart (NOVOLOG) 100 UNIT/ML injection vial     Sig: Per insulin pump- max dose 30 units per day     Dispense:  10 mL     Refill:  3       Requested Prescriptions Signed Prescriptions Disp Refills    insulin aspart (NOVOLOG) 100 UNIT/ML injection vial 10 mL 3     Sig: Per insulin pump- max dose 30 units per day       1. Type 1 diabetes mellitus without complication (HCC)  - Unstable  HbA1C goal is less than 7%. - Fasting blood glucose goal is 70-120mg/dl and postprandial blood sugar goal is less than 180 mg/dl. - Labs reviewed including most recent A1c, microalbumin and kidney function. Repeat labs due in 1 week. -We discussed in great detail dietary modifications they can make to better improve their blood sugars. -follow up diabetes education completed, all questions answered.  -labs are due now  CGM report downloaded and reviewed from the past 2 weeks scanned to media tab. Time in range 60%, 40% hyperglycemia, and hypoglycemia 0%. and average glucose 159 mg/dl. Insulin pump settings downloaded and reviewed. Scanned into media tab. -doing better, encouraged control iq and meal time bolus. Discussed signs and symptoms of hyper/hypoglycemia and how to treat. Encouraged 150 minutes of physical activity per week. Follow a low carbohydrate diet. Encouraged at least 7 hours of sleep. The patient was informed of the goals of diabetes management. This can only be accomplished by watching their diet and exercise levels. We certainly use medicines to help attain these goals. The consequences of not controlling blood sugars were discussed. These include blindness, heart disease, stroke, kidney disease, and possibly need for dialysis. They were told to be careful with their foot care as diabetics often have nerve damage, infections and risk for limb amutations . They also need a dilated eye exam yearly.  We discussed the issues of diet, exercise, medication, complication avoidance, reviewed the signs and symptoms of diabetes, hypoglycemic episodes, significance of HbA1C.     - insulin aspart (NOVOLOG) 100 UNIT/ML injection vial; Per insulin pump- max dose 30 units per day  Dispense: 10 mL; Refill: 3  - HM DIABETES FOOT EXAM    2. Plantar wart of left foot    - Ambulatory referral to Podiatry        Answered all patient questions. Agrees to follow plan of care and to follow up in 3 months, sooner if needed. Call office if unexplained blood sugars less than 70 occur or above 400. Call office or access MyChart with any further questions or concerns. Be sure to bring glucometer/food log at next appointment. Total time spent reviewing chart, labs, counseling patient and documenting on the date of the encounter: 30 min.       Electronically signed by CONY Fraire CNP on 10/5/2022 at 6:22 PM      (Please note that portions of this note were completed with a voice-recognition program. Efforts were made to edit the dictation but occasionally words are mis-transcribed.)

## 2022-10-13 ENCOUNTER — OFFICE VISIT (OUTPATIENT)
Dept: PODIATRY | Age: 27
End: 2022-10-13
Payer: COMMERCIAL

## 2022-10-13 VITALS
RESPIRATION RATE: 20 BRPM | WEIGHT: 121.2 LBS | DIASTOLIC BLOOD PRESSURE: 60 MMHG | SYSTOLIC BLOOD PRESSURE: 102 MMHG | BODY MASS INDEX: 22.9 KG/M2 | HEART RATE: 88 BPM

## 2022-10-13 DIAGNOSIS — B07.0 PLANTAR WART OF LEFT FOOT: Primary | ICD-10-CM

## 2022-10-13 DIAGNOSIS — M79.672 LEFT FOOT PAIN: ICD-10-CM

## 2022-10-13 PROCEDURE — 99203 OFFICE O/P NEW LOW 30 MIN: CPT | Performed by: PODIATRIST

## 2022-10-13 PROCEDURE — 17110 DESTRUCTION B9 LES UP TO 14: CPT | Performed by: PODIATRIST

## 2022-10-13 NOTE — PATIENT INSTRUCTIONS
Patient will leave this covered for 24 hours then will start OTC wart acid once per day and a drying agent (Rx lazerformalyde/aluminum chloride) once per day. One in the am and one in the PM.  Continue until follow up in 3 weeks.

## 2022-10-13 NOTE — PROGRESS NOTES
Subjective:  Jenna Davenport is a 28 y.o. female who presents to the office today complaining of a wart on the Left foot. Symptoms began  month(s) ago. Patient relates pain is Present. Pain is rated 2 out of 10 and is described as mild. Treatments prior to today's visit include: otc freezing. Currently denies F/C/N/V. Allergies   Allergen Reactions    Other Hives and Swelling     Shrimp- Swelling around mouth    Citalopram Rash       Past Medical History:   Diagnosis Date    Allergic rhinitis     Depression     Used to take citalopram, but it caused allergic reaction    Diabetes mellitus type 1 ([facility] [address]     diagnosed at age 24 after influenza infection    Dysmenorrhea     with heavy menses    Hyperlipidemia        Prior to Admission medications    Medication Sig Start Date End Date Taking? Authorizing Provider   aluminum chloride (DRYSOL) 20 % external solution Apply topically nightly. 10/13/22  Yes Clifford Moran DPM   insulin aspart (NOVOLOG) 100 UNIT/ML injection vial Per insulin pump- max dose 30 units per day 10/5/22  Yes CONY Gupta CNP   Continuous Blood Gluc Sensor (DEXCOM G6 SENSOR) MISC Use daily to check blood sugars. Change every 10 days. 9/15/22  Yes CONY Gupta CNP   Insulin Infusion Pump Supplies MISC Change infusion site every 2 days 22  Yes CONY Gupta CNP   Glucagon (GVOKE HYPOPEN 2-PACK) 1 MG/0.2ML SOAJ Inject 1 mg into the skin as needed (hypoglycemia) Inject 1mg under the skin as needed for low blood sugars. 22  Yes [address] [place] [address], APRN - CNP       No past surgical history on file.     Family History   Problem Relation Age of Onset    Inflam Bowel Dis Mother     Other Mother         GERD    Diabetes Paternal Grandfather         type 1       Social History     Tobacco Use    Smoking status: Former     Packs/day: 0.10     Types: Cigarettes     Start date: 2021     Quit date: 2021     Years since quittin.8    Smokeless tobacco: Former     Quit date: 12/7/2021    Tobacco comments:     Never was a consistant smoker, very light occasional smoker   Substance Use Topics    Alcohol use: No       ROS: All 14 ROS systems reviewed and pertinent positives noted above, all others negative. Lower Extremity Physical Examination:     Vitals:   Vitals:    10/13/22 1412   BP: 102/60   Pulse: 88   Resp: 20     General: AAO x 3 in NAD. Vascular: DP and PT pulses palpable 2/4, bilateral.  CFT <3 seconds, bilateral.  Hair growth present to the level of the digits, bilateral.  Edema absent, bilateral.  Varicosities absent, bilateral. Erythema absent, bilateral. Distal Rubor absent bilateral.  Temperature within normal limits bilateral. Hyperpigmentation absent bilateral. Atrophic skin no. Neurological: Sensation intact to light touch to level of digits, bilateral.  Protective sensation intact 10/10 sites via 5.07/10g [place] Monofilament, bilateral.  negative Tinel's, bilateral.  negative Valleix sign, bilateral.  Vibratory intact bilateral.  Reflexes Decreased bilateral.  Paresthesias negative. Dysthesias negative. Sharp/dull intact bilateral.  Musculoskeletal: Muscle strength 5/5, bilateral.  Pain is Absent  with lateral compression of the lesion. Ankle ROM within normal limits,bilateral.  1st MPJ ROM within normal limits, bilateral. No other foot deformities. Integument: Warm, dry, supple, bilateral.  Open lesion absent, bilateral.  Interdigital maceration absent to web spaces bilateral.  Nails within normal limits. Fissures absent, bilateral. Verrucous tissue present Left with loss of skin lines and pin point bleeding upon debridement. Seen x2 sub cental l heel. Assessment:   Diagnosis Orders   1. Plantar wart of left foot  CA DESTRUCTION BENIGN LESIONS UP TO 14      2. Left foot pain  CA DESTRUCTION BENIGN LESIONS UP TO 14        DM II    Plan:  1. Patients condition discussed and all questions answered.   DM foot ed and exam  Is low level medical decision making. Patient is acute uncomplicated condition. 1 new prescription. Low risk of morbidity. Orders Placed This Encounter   Medications    aluminum chloride (DRYSOL) 20 % external solution     Sig: Apply topically nightly. Dispense:  1 each     Refill:  1       2. A #10 blade and tissue nippers were used to debride all non viable and verrucous tissue. Patient had canthadrin applied for destruction of the lesion. This may be a staged treatment based on how patient responds. Patient will leave this covered for 24 hours then will start OTC wart acid once per day and a drying agent once per day. A Rx was given for Lazerformalyde of aluminium cholride (drysol) x1 bottle, #1, apply qd. 3.  Patient will follow up in 3week(s).

## 2022-11-16 ENCOUNTER — TELEPHONE (OUTPATIENT)
Dept: INTERNAL MEDICINE | Age: 27
End: 2022-11-16

## 2022-11-16 NOTE — TELEPHONE ENCOUNTER
Patient called stated that she recently got a urine test done for Dr. Mirtha Echols at [address] [place] [address]. Patient states that the urine test showed ketones and other abnormalities. States that she has had previous urine labs that show this from [address] [place] [address] and was wondering if this is normal because of her diabetes or what she should do or if she needs to do anything.

## 2022-11-16 NOTE — TELEPHONE ENCOUNTER
Ketones should no be in the urine and indicates ketosis due to high blood sugars and low insulin. It looks like this recent urine test was done on 10/25/22 and showed ketones and glucose. Since this was a few weeks ago I would recommend testing ketones at home if bs are running over 200. At the time of the urine test who were blood sugars running, were you taking your insulin regularly. Any symptoms of dka now? Review sign and symptoms of diabetic ketoacidosis or DKA. Including including increase in glucose usually over 250 mg/dl, increased urination, increased thirst, weight loss, changes in vision, abdominal pain, weakness, changes in mood, flu-like, rapid breathing or fruity scented breath. If symptoms are present and concerns for DKA patient should seek emergent care. If no concern for DKA but glucose levels are running high be sure to take prescribed medications, stay hydrated, monitor glucose levels closely, check ketones if applicable and call office or go to ER if worsening. \    (ABNORMAL) Urinalysis (10/25/2022 3:35 PM EDT)  Results - (ABNORMAL) Urinalysis (10/25/2022 3:35 PM EDT)  Component Value Ref Range Test Method Analysis Time Performed At Pathologist Signature   Color YELLOW YELLOW^YELLOW   10/25/2022 9:28 PM EDT [facility]     Turbidity CLEAR CLEAR^CLEAR   10/25/2022 9:28 PM EDT [facility]     Specific gravity 1.010 1.003 - 1.035   10/25/2022 9:28 PM EDT [facility]     Nitrite Negative Negative^Negative   10/25/2022 9:28 PM EDT [facility]     Ph urine 7.0 5.0 - 8.5   10/25/2022 9:28 PM EDT [facility]     Leukocyte esterase Negative Negative^Negative   10/25/2022 9:28 PM EDT [facility]     Comment: HIGH CONCENTRATIONS OF GLUCOSE MAY DECREASE THE REACTIVITY OF THE DIPSTICK LEUKOCYTE TEST PAD.    Protein Negative Negative^Negative mg/dL   10/25/2022 9:28 PM EDT [facility]     Glucose, Ur 1,000 (A) Negative^Negative mg/dL   10/25/2022 9:28 PM EDT [facility]     Ketones urine 20 (A) Negative^Negative mg/dL   10/25/2022 9:28 PM EDT [facility]     Urobilinogen <1.1 <1.1 eu/dL   10/25/2022 9:28 PM EDT [facility]     Bilirubin, urine Negative Negative^Negative   10/25/2022 9:28 PM EDT [facility]     Hemoglobin Negative Negative^Negative   10/25/2022 9:28 PM EDT [facility]     RBC <1 0 - 5 /hpf   10/25/2022 9:32 PM EDT [facility]     Squamous epithelium 4 0 - 5 /hpf   10/25/2022 9:32 PM EDT [facility]     WBC 4 0 - 5 /hpf   10/25/2022 9:32 PM EDT [facility]       Results - (ABNORMAL) Urinalysis (10/25/2022 3:35 PM EDT)  Specimen (Source) Anatomical Location / Laterality Collection Method / Volume Collection Time Received Time     Urine / Unknown   10/25/2022 3:35 PM EDT 10/25/2022 3:36 PM EDT     Results - (ABNORMAL) Urinalysis (10/25/2022 3:35 PM EDT)  Authorizing Provider Result Type   Pawan Avila MD URINE ORDERABLES     Results - (ABNORMAL) Urinalysis (10/25/2022 3:35 PM EDT)  Performing Organization Address City/State/ZIP Code Phone Number   [email or web address]

## 2023-01-04 ENCOUNTER — HOSPITAL ENCOUNTER (OUTPATIENT)
Age: 28
Discharge: HOME | End: 2023-01-04
Payer: COMMERCIAL

## 2023-01-04 LAB
HIV AG/AB: NONREACTIVE
T. PALLIDUM, IGG: NONREACTIVE

## 2023-01-04 PROCEDURE — 87491 CHLMYD TRACH DNA AMP PROBE: CPT

## 2023-01-04 PROCEDURE — 86780 TREPONEMA PALLIDUM: CPT

## 2023-01-04 PROCEDURE — 86696 HERPES SIMPLEX TYPE 2 TEST: CPT

## 2023-01-04 PROCEDURE — 36415 COLL VENOUS BLD VENIPUNCTURE: CPT

## 2023-01-04 PROCEDURE — 86694 HERPES SIMPLEX NES ANTBDY: CPT

## 2023-01-04 PROCEDURE — 87389 HIV-1 AG W/HIV-1&-2 AB AG IA: CPT

## 2023-01-04 PROCEDURE — 86695 HERPES SIMPLEX TYPE 1 TEST: CPT

## 2023-01-04 PROCEDURE — 87591 N.GONORRHOEAE DNA AMP PROB: CPT

## 2023-01-05 ENCOUNTER — PATIENT MESSAGE (OUTPATIENT)
Dept: INTERNAL MEDICINE | Age: 28
End: 2023-01-05

## 2023-01-05 DIAGNOSIS — E10.9 TYPE 1 DIABETES MELLITUS WITHOUT COMPLICATION (HCC): Primary | ICD-10-CM

## 2023-01-05 LAB
C. TRACHOMATIS DNA ,URINE: NEGATIVE
HERPES SIMPLEX VIRUS 1 IGG: 7.14
HERPES SIMPLEX VIRUS 2 IGG: 0.75
HERPES TYPE 1/2 IGM COMBINED: 0.53
N. GONORRHOEAE DNA, URINE: NEGATIVE
SPECIMEN DESCRIPTION: NORMAL

## 2023-01-23 RX ORDER — BLOOD-GLUCOSE TRANSMITTER
EACH MISCELLANEOUS
Qty: 1 EACH | Refills: 3 | Status: SHIPPED | OUTPATIENT
Start: 2023-01-23

## 2023-01-23 NOTE — TELEPHONE ENCOUNTER
From: Ronald Pablo  Sent: 1/21/2023 8:57 PM EST  To: [facility] Internal Med Clinical Staff  Subject: Pump supplies to DME    I dont have any new information from insurance for my pump supplies. But would someone from the office be able to send a prescription for a new Dexcom g6 transmitter to the [address] [place] [address] on file?

## 2023-01-26 NOTE — TELEPHONE ENCOUNTER
Spoke with ION at [facility] and she stated that they are no longer contracted with her insurance and suggested pt call insurance to see what DME is now. . As pt has already done. Notified pt we will still be working on and see what [place] states.

## 2023-01-26 NOTE — TELEPHONE ENCOUNTER
Can we call sheree to see if we can complete the pa with them or have them prompt one. So she does not have to changes supply companies.

## 2023-02-08 ENCOUNTER — HOSPITAL ENCOUNTER (OUTPATIENT)
Age: 28
Discharge: HOME | End: 2023-02-08
Payer: COMMERCIAL

## 2023-02-08 ENCOUNTER — OFFICE VISIT (OUTPATIENT)
Dept: DIABETES SERVICES | Age: 28
End: 2023-02-08

## 2023-02-08 VITALS
SYSTOLIC BLOOD PRESSURE: 98 MMHG | HEART RATE: 80 BPM | WEIGHT: 113 LBS | DIASTOLIC BLOOD PRESSURE: 62 MMHG | BODY MASS INDEX: 21.34 KG/M2 | RESPIRATION RATE: 16 BRPM | HEIGHT: 61 IN

## 2023-02-08 DIAGNOSIS — E10.9 TYPE 1 DIABETES MELLITUS WITHOUT COMPLICATION (HCC): ICD-10-CM

## 2023-02-08 LAB
ANION GAP SERPL CALCULATED.3IONS-SCNC: 9 MMOL/L (ref 9–17)
BUN SERPL-MCNC: 11 MG/DL (ref 6–20)
BUN/CREAT BLD: 16 (ref 9–20)
CALCIUM SERPL-MCNC: 9.4 MG/DL (ref 8.6–10.4)
CHLORIDE SERPL-SCNC: 104 MMOL/L (ref 98–107)
CO2 SERPL-SCNC: 27 MMOL/L (ref 20–31)
CREAT SERPL-MCNC: 0.69 MG/DL (ref 0.5–0.9)
CREATININE URINE: 77 MG/DL (ref 28–217)
EST. AVERAGE GLUCOSE BLD GHB EST-MCNC: 137 MG/DL
GFR SERPL CREATININE-BSD FRML MDRD: >60 ML/MIN/1.73M2
GLUCOSE SERPL-MCNC: 156 MG/DL (ref 70–99)
HBA1C MFR BLD: 6.4 % (ref 4–6)
MICROALBUMIN/CREAT 24H UR: <12 MG/L
MICROALBUMIN/CREAT UR-RTO: NORMAL MCG/MG CREAT
POTASSIUM SERPL-SCNC: 4.7 MMOL/L (ref 3.7–5.3)
SODIUM SERPL-SCNC: 140 MMOL/L (ref 135–144)
TSH SERPL-ACNC: 1.4 UIU/ML (ref 0.3–5)

## 2023-02-08 PROCEDURE — 80048 BASIC METABOLIC PNL TOTAL CA: CPT

## 2023-02-08 PROCEDURE — 84443 ASSAY THYROID STIM HORMONE: CPT

## 2023-02-08 PROCEDURE — 82043 UR ALBUMIN QUANTITATIVE: CPT

## 2023-02-08 PROCEDURE — 82570 ASSAY OF URINE CREATININE: CPT

## 2023-02-08 PROCEDURE — 83036 HEMOGLOBIN GLYCOSYLATED A1C: CPT

## 2023-02-08 PROCEDURE — 36415 COLL VENOUS BLD VENIPUNCTURE: CPT

## 2023-02-08 RX ORDER — GLUCAGON INJECTION, SOLUTION 1 MG/.2ML
1 INJECTION, SOLUTION SUBCUTANEOUS PRN
Qty: 1 EACH | Refills: 1 | Status: SHIPPED | OUTPATIENT
Start: 2023-02-08

## 2023-02-08 ASSESSMENT — ENCOUNTER SYMPTOMS
DIARRHEA: 0
RESPIRATORY NEGATIVE: 1
SHORTNESS OF BREATH: 0
ABDOMINAL PAIN: 0

## 2023-02-08 NOTE — PROGRESS NOTES
[address] [facility] INTERNAL MED A DEPARTMENT OF [place] [address]  [address] [facility], [address]  [facility] [address] [phone]        HISTORY:    Steffanie Rivera presents today for evaluation and management of:  Chief Complaint   Patient presents with    Diabetes    3 Month Follow-Up       Patient Care Team:  Teddy Gutierrez DO as PCP - General (Internal Medicine)  Teddy Gutierrez DO as PCP - Empaneled Provider      Interval History:    Current Diabetic Medications  humalog for use in tslim insulin pump TDD 20 units     DKA episodes: age 24 with diagnosis       10/05/22   Steffanie Rivera is a 28 y.o. female patient who presents to clinic today for her diabetes. she has a history of non-compliance and depression/anxiety which contributes to her diabetes. At previous visit diabetes counseling was provided. she denies any current signs or symptoms of hyper/hypoglycemia. she states they are taking their medications as prescribed without any adverse effects. She has been using control Iq more but still questions some of its adjustments. Diet: Counting carbohydrate  Exercise: cardio  BS testing: uses cgm daily with success and is adherent to cgm therapy  Issues: Denies  Diabetic foot exam up-to-date: No  Diabetic retinal exam up-to-date: No  Hypoglycemia as needed treatment: snack, apple juice, glucose tabs and glucose juice. Has urine ketone strips. 02/08/23   Steffanie Rivera is a 28 y.o. female patient who presents to clinic today for her diabetes. she has a history of non-compliance and depression/anxiety which contributes to her diabetes. At previous visit diabetes counseling was provided. she denies any current signs or symptoms of hyper/hypoglycemia. she states they are taking their medications as prescribed without any adverse effects.    Diet: Counting carbohydrate  Exercise: cardio  BS testing: uses cgm daily with success and is adherent to cgm therapy  Issues: Denies  Diabetic foot exam up-to-date: No  Diabetic retinal exam up-to-date: No  Hypoglycemia as needed treatment: snack, apple juice, glucose tabs and glucose juice. Has urine ketone strips. Past Medical History:   Diagnosis Date    Allergic rhinitis     Depression     Used to take citalopram, but it caused allergic reaction    Diabetes mellitus type 1 ([facility] [address]     diagnosed at age 24 after influenza infection    Dysmenorrhea     with heavy menses    Hyperlipidemia      Family History   Problem Relation Age of Onset    Inflam Bowel Dis Mother     Other Mother         GERD    Diabetes Paternal Grandfather         type 1     Social History     Tobacco Use    Smoking status: Former     Packs/day: 0.10     Types: Cigarettes     Start date: 2021     Quit date: 2021     Years since quittin.1    Smokeless tobacco: Former     Quit date: 2021    Tobacco comments:     Never was a consistant smoker, very light occasional smoker   Vaping Use    Vaping Use: Never used   Substance Use Topics    Alcohol use: No    Drug use: Yes     Frequency: 7.0 times per week     Types: Marijuana (Weed)     Comment: smokes one per day     Allergies   Allergen Reactions    Other Hives and Swelling     Shrimp- Swelling around mouth    Citalopram Rash       MEDICATIONS:  Current Outpatient Medications   Medication Sig Dispense Refill    Glucagon (GVOKE HYPOPEN 2-PACK) 1 MG/0.2ML SOAJ Inject 1 mg into the skin as needed (hypoglycemia) Inject 1mg under the skin as needed for low blood sugars. 1 each 1    insulin aspart (NOVOLOG) 100 UNIT/ML injection vial Per insulin pump- max dose 30 units per day 10 mL 3    Continuous Blood Gluc Transmit (DEXCOM G6 TRANSMITTER) MISC Use daily to check blood sugars. Change every 90 days. 1 each 3    Continuous Blood Gluc Sensor (DEXCOM G6 SENSOR) MISC Use daily to check blood sugars. Change every 10 days.  3 each 3    Insulin Infusion Pump Supplies MISC Change infusion site every 2 days 15 each 3     No current facility-administered medications for this visit. Review ofSymptoms:  Review of Systems   Constitutional:  Positive for fatigue. Negative for unexpected weight change. Eyes:  Negative for visual disturbance. Respiratory: Negative. Negative for shortness of breath. Cardiovascular:  Negative for chest pain and leg swelling. Gastrointestinal:  Negative for abdominal pain and diarrhea. Endocrine: Negative for polydipsia, polyphagia and polyuria. Genitourinary: Negative. Musculoskeletal: Negative. Skin:  Negative for rash and wound. Neurological:  Negative for dizziness, tremors, seizures and headaches. Psychiatric/Behavioral: Negative. Negative for confusion and decreased concentration. Theremainder of a complete 14-point review of systems is negative. Vital Signs: BP 98/62 (Site: Right Upper Arm, Position: Sitting, Cuff Size: Medium Adult)   Pulse 80   Resp 16   Ht 5' 1\" (1.549 m)   Wt 113 lb (51.3 kg)   LMP 02/01/2023   BMI 21.35 kg/m²      Wt Readings from Last 3 Encounters:   02/08/23 113 lb (51.3 kg)   10/13/22 121 lb 3.2 oz (55 kg)   10/05/22 119 lb (54 kg)     Body mass index is 21.35 kg/m².   LABS:  Hemoglobin A1C   Date Value Ref Range Status   03/30/2022 8.3 (H) 4.0 - 6.0 % Final   03/02/2021 8.3 % Final     Lab Results   Component Value Date    LABMICR 23 03/30/2022     Lab Results   Component Value Date     03/30/2022    K 4.6 03/30/2022     03/30/2022    CO2 29 03/30/2022    BUN 18 03/30/2022    CREATININE 0.58 03/30/2022    GLUCOSE 91 03/30/2022    CALCIUM 9.5 03/30/2022    PROT 6.9 09/05/2014    LABALBU 4.5 09/05/2014    BILITOT 0.28 (L) 09/05/2014    ALKPHOS 52 09/05/2014    AST 15 09/05/2014    ALT 10 09/05/2014    LABGLOM >60 03/30/2022    GFRAA >60 03/30/2022     Lab Results   Component Value Date    CHOL 167 03/30/2022    CHOL 180 05/18/2017    CHOL 173 09/05/2014     Lab Results   Component Value Date TRIG 32 03/30/2022    TRIG 51 05/18/2017    TRIG 55 09/05/2014     Lab Results   Component Value Date    HDL 72 03/30/2022    HDL 61 05/18/2017    HDL 50 09/05/2014     Lab Results   Component Value Date    LDLCHOLESTEROL 89 03/30/2022    LDLCHOLESTEROL 112 09/05/2014    LDLCALC 109 05/18/2017     Lab Results   Component Value Date    VLDL 10 05/18/2017    VLDL 11 09/05/2014     Lab Results   Component Value Date    CHOLHDLRATIO 2.3 03/30/2022    CHOLHDLRATIO 3.0 05/18/2017    CHOLHDLRATIO 3.5 09/05/2014           Physical Exam  Constitutional:       Appearance: She is well-developed. Eyes:      Pupils: Pupils are equal, round, and reactive to light. Neck:      Thyroid: No thyroid mass, thyromegaly or thyroid tenderness. Cardiovascular:      Rate and Rhythm: Normal rate and regular rhythm. Heart sounds: Normal heart sounds. Pulmonary:      Effort: Pulmonary effort is normal.      Breath sounds: Normal breath sounds. Abdominal:      General: Bowel sounds are normal.      Palpations: Abdomen is soft. Skin:     General: Skin is warm and dry. Comments: Negative for open/nonhealing wounds. Negative for lipohypertrophy. Neurological:      Mental Status: She is alert and oriented to person, place, and time. ASSESSMENT/PLAN:     Diagnosis Orders   1. Type 1 diabetes mellitus without complication (HCC)  Glucagon (GVOKE HYPOPEN 2-PACK) 1 MG/0.2ML SOAJ    Basic Metabolic Panel    Hemoglobin A1C    Microalbumin, Ur    TSH With Reflex Ft4        Orders Placed This Encounter   Procedures    Basic Metabolic Panel    Hemoglobin A1C    Microalbumin, Ur    TSH With Reflex Ft4       Orders Placed This Encounter   Medications    Glucagon (GVOKE HYPOPEN 2-PACK) 1 MG/0.2ML SOAJ     Sig: Inject 1 mg into the skin as needed (hypoglycemia) Inject 1mg under the skin as needed for low blood sugars.      Dispense:  1 each     Refill:  1       Requested Prescriptions     Signed Prescriptions Disp Refills Glucagon (GVOKE HYPOPEN 2-PACK) 1 MG/0.2ML SOAJ 1 each 1     Sig: Inject 1 mg into the skin as needed (hypoglycemia) Inject 1mg under the skin as needed for low blood sugars. 1. Type 1 diabetes mellitus without complication (HCC)  - Unstable  HbA1C goal is less than 7%. - Fasting blood glucose goal is 70-120mg/dl and postprandial blood sugar goal is less than 180 mg/dl. - Labs reviewed including most recent A1c, UACR and kidney function. Repeat labs due in 1 week. -We discussed in great detail dietary modifications they can make to better improve their blood sugars. -follow up diabetes education completed, all questions answered. CGM report downloaded and reviewed from the past 2 weeks scanned to media tab. Time in range 65%, 35% hyperglycemia, and hypoglycemia 0%. average glucose 156 mg/dl. Insulin pump settings downloaded and reviewed. Scanned into media tab. -doing well no changes, encouraged food and correction bolus. Discussed signs and symptoms of hyper/hypoglycemia and how to treat. Encouraged 150 minutes of physical activity per week. Follow a low carbohydrate diet. Encouraged at least 7 hours of sleep. The patient was informed of the goals of diabetes management. This can only be accomplished by watching their diet and exercise levels. We certainly use medicines to help attain these goals. The consequences of not controlling blood sugars were discussed. These include blindness, heart disease, stroke, kidney disease, and possibly need for dialysis. They were told to be careful with their foot care as diabetics often have nerve damage, infections and risk for limb amutations . They also need a dilated eye exam yearly.  We discussed the issues of diet, exercise, medication, complication avoidance, reviewed the signs and symptoms of diabetes, hypoglycemic episodes, significance of HbA1C.     - Glucagon (GVOKE HYPOPEN 2-PACK) 1 MG/0.2ML SOAJ; Inject 1 mg into the skin as needed (hypoglycemia) Inject 1mg under the skin as needed for low blood sugars. Dispense: 1 each; Refill: 1  - Basic Metabolic Panel; Future  - Hemoglobin A1C; Future  - Microalbumin, Ur; Future  - TSH With Reflex Ft4; Future            Answered all patient questions. Agrees to follow plan of care and to follow up in 3 months, sooner if needed. Call office if unexplained blood sugars less than 70 occur or above 400. Call office or access [employer] with any further questions or concerns. Be sure to bring glucometer/food log at next appointment. Total time spent reviewing chart, labs, counseling patient and documenting on the date of the encounter: 30 min.       Electronically signed by CONY Sim CNP on 2/8/2023 at 9:30 AM      (Please note that portions of this note were completed with a voice-recognition program. Efforts were made to edit the dictation but occasionally words are mis-transcribed.)

## 2023-02-21 DIAGNOSIS — E10.9 TYPE 1 DIABETES MELLITUS WITHOUT COMPLICATION (HCC): ICD-10-CM

## 2023-02-22 RX ORDER — BLOOD-GLUCOSE SENSOR
EACH MISCELLANEOUS
Qty: 9 EACH | Refills: 3 | Status: SHIPPED | OUTPATIENT
Start: 2023-02-22

## 2023-07-10 RX ORDER — INSULIN LISPRO 100 [IU]/ML
INJECTION, SOLUTION SUBCUTANEOUS
Qty: 5 ADJUSTABLE DOSE PRE-FILLED PEN SYRINGE | Refills: 0 | Status: SHIPPED | OUTPATIENT
Start: 2023-07-10

## 2023-07-10 NOTE — TELEPHONE ENCOUNTER
[facility]    Neurology Consultation     Date of Admission:  3/19/2021    Assessment & Plan   Thanh Goodirch is a 77 year old male who was admitted on 3/19/2021. I was asked to see the patient for management of Myasthenia gravis.    # Myasthenia gravis since 2005 and recurrent flare ups/p IVIG 3 days 3/20-3/22/21  with clinical improvement but not to his baseline  Currently - + diplopia, dysphonia, proximal LE weakness ( patient reports 60-70% of his baseline strength)      -Continue to check VC, NIF: last value -28 and 1300 per note on 3/25   -Mycophenolate 1000/ 500mg/day  -Prednisone 60mg every day- no plan for tapering until seeing outpatient neurology follow up  -Will try mestinon 60mg TIDif there is no contraindication ( such as bradycardia or GI symptom)   - Continue PT  - Will consider PLEX later next week if no significant improvement      Briana Allen MD  [facility]  Office Phone [phone]    History of Present Illness   From discontinue summary/ prior note    Thanh Goodrich is an 77 year old male with history of myasthenia gravis, hypertension, prostate cancer s/p resection, morbid obesity, and chronic hyponatremia who presented 3/4/2021 with generalized weakness and hypoxic respiratory failure, intubated and transferred to ICU, treated with IVIG and steroids. Extubated 3/11/21. discharged on 3/15.     Then he was readmitted on 3/19 for recurrent exacerbation requiring reintubation for hypercarbic respiratory failure. Following his recent discharge his steroids were decreased from 60mg to 50 to 40mg.  He does not like the side effects of steroids.  He had been maintained on CellCept alone (100/500) for many years without symptoms.     He received another IVIG for 3 days on 3/20-3/22 and kept steroid to 60mg  Without tapering.until outpatient followup. He was extubated on 3/22 and now transferred to 7th floor.  Recovered to about 50-60% of his baseline     +diplopia,  Patient called in and complains of her pump sites failing and she would like to know if you would write her a prescription for 1/2 unit pens to go to [facility] in [facility]. Please advise.     Gerhardt Childs, MA dysphonia- prox muscle weakness leg> arm   No dysphagia, dysarthria or neck weakness,       Past Medical History    I have reviewed this patient's medical history and updated it with pertinent information if needed.   Past Medical History:   Diagnosis Date     Atypical mycobacterial infection 1/25/2013     Cellulitis of left leg 9/23/2012     Depressive disorder 2020     Edema 7/7/2009     History of steroid therapy 2/22/2010     Hyperlipidemia LDL goal <130 10/31/2010     HYPERTENSION 7/8/2003     Incontinence of urine 10/25/2010     Leg ulcer (H) 9/20/2012     MALIGN NEOPL PROSTATE-3/07 4/2/2007    T1C, Shaina 8, initial PSA 39, s/p radiation seed implants 2007      Myasthenia gravis (H)      OBESITY 7/8/2003     Osteoporosis 8/18/2009    Refused bisphosphonates 2011      PVC's (premature ventricular contractions) 11/23/2011     RIGHT OCCIPITAL PAIN 7/8/2003     ROTATOR CUFF SYND NOS- RT 9/19/2005     Skin nodule 3/18/2013     ulcer left shin 10/8/2007     Uncomplicated asthma 1944?    childhood only       Past Surgical History   I have reviewed this patient's surgical history and updated it with pertinent information if needed.  Past Surgical History:   Procedure Laterality Date     BIOPSY  2013?    dealt with     Prostate Cancer Cryotherapy  2007     TONSILLECTOMY  1945       Prior to Admission Medications   Prior to Admission Medications   Prescriptions Last Dose Informant Patient Reported? Taking?   Cholecalciferol 100 MCG (4000 UT) CAPS 3/19/2021 at am  Yes Yes   Sig: Take 4,000 Units by mouth daily   Multiple Vitamins-Minerals (MULTIVITAMIN ADULTS 50+) TABS 3/19/2021 at am  Yes Yes   Sig: Take 1 tablet by mouth daily    acetaminophen (TYLENOL) 325 MG tablet 3/19/2021 at 0125  Yes Yes   Sig: Take 650 mg by mouth every 4 hours as needed for mild pain   calcium carbonate 600 mg-vitamin D 400 units (CALTRATE) 600-400 MG-UNIT per tablet 3/19/2021 at am  Yes Yes   Sig: Take 1 tablet by mouth daily   furosemide  (LASIX) 20 MG tablet 3/19/2021 at 1200  No Yes   Sig: Take 1 tablet (20 mg) by mouth 2 times daily   metoprolol tartrate (LOPRESSOR) 25 MG tablet 3/19/2021 at am  No Yes   Sig: Take 0.5 tablets (12.5 mg) by mouth 2 times daily   mycophenolate (GENERIC EQUIVALENT) 500 MG tablet 3/19/2021 at am  Yes Yes   Sig: Take 1,000 mg by mouth every morning   mycophenolate (GENERIC EQUIVALENT) 500 MG tablet 3/18/2021 at hs  Yes Yes   Sig: Take 500 mg by mouth At Bedtime    potassium chloride ER (KLOR-CON M) 10 MEQ CR tablet 3/19/2021 at pm  No Yes   Sig: Take 2 tablets (20 mEq) by mouth 2 times daily   predniSONE (DELTASONE) 50 MG tablet 3/19/2021 at am-50mg  No Yes   Sig: Take 1 tablet (50 mg) by mouth daily Take 50 mg until 3/19.  Then decrease by 10 mg every 7 days starting on 3/20      Facility-Administered Medications: None     Allergies   Allergies   Allergen Reactions     Ciprofloxacin Other (See Comments)     Contraindicated for myasthenia gravis patients     Procainamide Other (See Comments)     Contraindicated for myasthenia gravis patients     Quinidine Other (See Comments)     Contraindicated for myasthenia gravis patients     Quinine Other (See Comments)     Contraindicated for myasthenia gravis patients       Social History   I have reviewed this patient's social history and updated it with pertinent information if needed. Thanh Goodrich  reports that he has never smoked. He has never used smokeless tobacco. He reports current alcohol use. He reports that he does not use drugs.    Family History   I have reviewed this patient's family history and updated it with pertinent information if needed.   Family History   Problem Relation Age of Onset     Hypertension Mother      Depression Mother      Substance Abuse Mother         alcohol     Hypertension Father      Cancer Father         Prostate     Prostate Cancer Father      Substance Abuse Father         alcohol     Obesity Father      Diabetes Maternal Grandmother       RYAN. Maternal Grandmother      Cerebrovascular Disease Maternal Grandfather      Cancer Paternal Uncle         Prostate     Prostate Cancer Other        Review of Systems   The 10 point Review of Systems is negative other than noted in the HPI or here.     double vision/blurry+    Patient denies any LOC, HA, vision change  vision/ vision loss), dizziness, imbalance, nausea, vomiting, dysphagia, dysarthria, weakness, numbness, tingling, incontinence, saddle anesthesia, prior seizure, stroke, trauma, brain surgery, weight change, recent illness, bleeding, bruising, fever, diarrhea, chest pain or shortness breath    Physical Exam   Temp: 97.3  F (36.3  C) Temp src: Oral BP: 115/65 Pulse: 82   Resp: 18 SpO2: 96 % O2 Device: Nasal cannula Oxygen Delivery: 2 LPM  Vital Signs with Ranges  Temp:  [97.3  F (36.3  C)-98.8  F (37.1  C)] 97.3  F (36.3  C)  Pulse:  [] 82  Resp:  [17-18] 18  BP: (115-137)/(60-80) 115/65  SpO2:  [94 %-98 %] 96 %  282 lbs 13.6 oz    Limited due to telemedicine    General appearance:No acute distress   Neuro/Psych:Awake, alert, oriented *3    Cranial nerves: Grossly II-XII intact   Fundi/Optic disc: Not available  Extraocular movements intact. No nystagmus, Face appears symmetric. Facial sensation intact(cannot assess). Hearing intact to finger rub (cannot assess).   Motor strength: Upper can hold up arms without drift /Lower extremities at least 3/5 bilaterally,     Range of motion: Partially limited  Sensory: symmetric response to LT stimuli in both upper and lower extremities   Reflexes: Not available  Babinski/Clonus/Saab: Not available  Coordination: FTN /NAWAF intact- cannot perform tandem gait  Romberg  n/a  Gait: cannot stand up     EOM + binocular horizontal in all direction  Cheek puff  moderate   whistle +  Tongue/pharyngeal strength  Cough moderate        This is a telemedicine visit that was performed with the originating site at [facility] and the distant site at  provider s home in [place], [place]. Verbal consent was given to participate in video visit using [employer] real-time telemedicine video conference.  This visit occurred during the Coronavirus (COVID-19) Public Health Emergency and was requested by patient due to contact concerns.     I discussed with the patient the nature of our telemedicine visits, that:      I would evaluate the patient and recommend diagnostics and treatments based on my assessment and the exam is limited due to the nature of telemedicine visit.    Our sessions are not being recorded and that personal health information is protected    Our team would provide followup care in person if/when the patient needs it.     Patient identification was verified before the start of the encounter.      total time : 70 minutes  More than 50% of the time was spent on counseling on the Pathophysiology and differential diagnosis of the flare up fo maysthenia, the iside effect of mestinon nd other Risk factor management as well as discussing/coordinating care with hospital staffs.

## 2023-07-12 RX ORDER — INSULIN GLARGINE 100 [IU]/ML
8 INJECTION, SOLUTION SUBCUTANEOUS NIGHTLY
Qty: 5 ADJUSTABLE DOSE PRE-FILLED PEN SYRINGE | Refills: 3 | Status: SHIPPED | OUTPATIENT
Start: 2023-07-12

## 2023-07-24 ENCOUNTER — OFFICE VISIT (OUTPATIENT)
Dept: DIABETES SERVICES | Age: 28
End: 2023-07-24
Payer: COMMERCIAL

## 2023-07-24 ENCOUNTER — TELEPHONE (OUTPATIENT)
Dept: INTERNAL MEDICINE | Age: 28
End: 2023-07-24

## 2023-07-24 VITALS
DIASTOLIC BLOOD PRESSURE: 68 MMHG | WEIGHT: 116 LBS | RESPIRATION RATE: 16 BRPM | BODY MASS INDEX: 21.9 KG/M2 | HEART RATE: 92 BPM | SYSTOLIC BLOOD PRESSURE: 114 MMHG | HEIGHT: 61 IN

## 2023-07-24 DIAGNOSIS — E10.9 TYPE 1 DIABETES MELLITUS WITHOUT COMPLICATION (HCC): Primary | ICD-10-CM

## 2023-07-24 PROCEDURE — 95251 CONT GLUC MNTR ANALYSIS I&R: CPT | Performed by: NURSE PRACTITIONER

## 2023-07-24 PROCEDURE — 3044F HG A1C LEVEL LT 7.0%: CPT | Performed by: NURSE PRACTITIONER

## 2023-07-24 PROCEDURE — 99214 OFFICE O/P EST MOD 30 MIN: CPT | Performed by: NURSE PRACTITIONER

## 2023-07-24 ASSESSMENT — ENCOUNTER SYMPTOMS
ABDOMINAL PAIN: 0
RESPIRATORY NEGATIVE: 1
DIARRHEA: 0
SHORTNESS OF BREATH: 0

## 2023-07-24 NOTE — PROGRESS NOTES
LABALBU 4.5 09/05/2014    BILITOT 0.28 (L) 09/05/2014    ALKPHOS 52 09/05/2014    AST 15 09/05/2014    ALT 10 09/05/2014    LABGLOM >60 02/08/2023    GFRAA >60 03/30/2022     Lab Results   Component Value Date    CHOL 167 03/30/2022    CHOL 180 05/18/2017    CHOL 173 09/05/2014     Lab Results   Component Value Date    TRIG 32 03/30/2022    TRIG 51 05/18/2017    TRIG 55 09/05/2014     Lab Results   Component Value Date    HDL 72 03/30/2022    HDL 61 05/18/2017    HDL 50 09/05/2014     Lab Results   Component Value Date    LDLCHOLESTEROL 89 03/30/2022    LDLCHOLESTEROL 112 09/05/2014    LDLCALC 109 05/18/2017     Lab Results   Component Value Date    VLDL 10 05/18/2017    VLDL 11 09/05/2014     Lab Results   Component Value Date    CHOLHDLRATIO 2.3 03/30/2022    CHOLHDLRATIO 3.0 05/18/2017    CHOLHDLRATIO 3.5 09/05/2014           Physical Exam  Constitutional:       Appearance: Normal appearance. HENT:      Head: Normocephalic. Cardiovascular:      Rate and Rhythm: Normal rate. Pulmonary:      Effort: Pulmonary effort is normal.   Musculoskeletal:      Cervical back: Normal range of motion. Neurological:      General: No focal deficit present. Mental Status: She is alert and oriented to person, place, and time. Psychiatric:         Mood and Affect: Mood normal.         Behavior: Behavior normal.         Thought Content: Thought content normal.         Judgment: Judgment normal.         ASSESSMENT/PLAN:     Diagnosis Orders   1. Type 1 diabetes mellitus without complication (HCC)  Lipid Panel    Insulin Infusion Pump Supplies (INSULIN INFUSION PUMP INFU SET) MISC        Orders Placed This Encounter   Procedures    Lipid Panel     Orders Placed This Encounter   Medications    Insulin Infusion Pump Supplies (INSULIN INFUSION PUMP INFU SET) MISC     Sig: Change 90 degree autosoft infusion site every 2 days.      Dispense:  45 each     Refill:  5     90 day supply     Requested Prescriptions     Signed

## 2023-07-24 NOTE — TELEPHONE ENCOUNTER
Patient requesting to switch her infusion set for her pump to a 90 degree angle with every 2 day change. Script printed and signed by [facility] at [address] today 7/24/2023. Faxed to Derick Vee at fax number [phone] along with demographics sheet.

## 2023-09-18 NOTE — TELEPHONE ENCOUNTER
Pharmacy requesting a refill of the below medication which has been pended for you:     Requested Prescriptions     Pending Prescriptions Disp Refills    HUMALOG GALILEO KWIKPEN 100 UNIT/ML SOPN [Pharmacy Med Name: HumaLOG Galileo KwikPen 100 UNIT/ML Subcutaneous Solution Pen-injector] 15 mL 0     Sig: INJECT SUBCUTANEOUSLY THREE TIMES DAILY WITH MEALS USING  SLIDING  SCALE,  MAX  DAILY  DOSE  IS  36  UNITS       Last Appointment Date: 7/24/2023  Next Appointment Date: 10/25/2023    Allergies   Allergen Reactions    Other Hives and Swelling     Shrimp- Swelling around mouth    Citalopram Rash

## 2023-09-19 RX ORDER — INSULIN LISPRO 100 [IU]/ML
INJECTION, SOLUTION SUBCUTANEOUS
Qty: 15 ML | Refills: 0 | Status: SHIPPED | OUTPATIENT
Start: 2023-09-19

## 2023-11-09 DIAGNOSIS — E10.9 TYPE 1 DIABETES MELLITUS WITHOUT COMPLICATION (HCC): ICD-10-CM

## 2023-11-09 RX ORDER — INSULIN ASPART 100 [IU]/ML
INJECTION, SOLUTION INTRAVENOUS; SUBCUTANEOUS
Qty: 10 ML | Refills: 0 | Status: SHIPPED | OUTPATIENT
Start: 2023-11-09

## 2023-11-09 NOTE — TELEPHONE ENCOUNTER
Pharmacy requesting a refill of the below medication which has been pended for you:     Requested Prescriptions     Pending Prescriptions Disp Refills    NOVOLOG 100 UNIT/ML injection vial [Pharmacy Med Name: NovoLOG 100 UNIT/ML Subcutaneous Solution] 10 mL 0     Sig: USE INSULIN PUMP FOR 30 UNITS MAXIMUM PER DAY       Last Appointment Date: 7/24/23  Next Appointment Date: 1/8/24    Allergies   Allergen Reactions    Other Hives and Swelling     Shrimp- Swelling around mouth    Citalopram Rash

## 2023-11-13 ENCOUNTER — TELEPHONE (OUTPATIENT)
Dept: INTERNAL MEDICINE | Age: 28
End: 2023-11-13

## 2023-11-13 DIAGNOSIS — R73.9 HYPERGLYCEMIA: Primary | ICD-10-CM

## 2023-11-13 NOTE — TELEPHONE ENCOUNTER
Patient states her blood sugars have been off. Running 300 to 500'   she is requesting thyroid labs as she thinks this can effect  her sugars.   Let her know [phone]

## 2023-11-14 ENCOUNTER — HOSPITAL ENCOUNTER (OUTPATIENT)
Age: 28
Discharge: HOME | End: 2023-11-14
Payer: COMMERCIAL

## 2023-11-14 DIAGNOSIS — R73.9 HYPERGLYCEMIA: ICD-10-CM

## 2023-11-14 DIAGNOSIS — E10.9 TYPE 1 DIABETES MELLITUS WITHOUT COMPLICATION (HCC): ICD-10-CM

## 2023-11-14 LAB
CHOLEST SERPL-MCNC: 205 MG/DL
CHOLESTEROL/HDL RATIO: 3.1
HDLC SERPL-MCNC: 66 MG/DL
LDLC SERPL CALC-MCNC: 118 MG/DL (ref 0–130)
T4 FREE SERPL-MCNC: 1.1 NG/DL (ref 0.9–1.7)
TRIGL SERPL-MCNC: 106 MG/DL
TSH SERPL DL<=0.05 MIU/L-ACNC: 0.49 UIU/ML (ref 0.3–5)

## 2023-11-14 PROCEDURE — 83036 HEMOGLOBIN GLYCOSYLATED A1C: CPT

## 2023-11-14 PROCEDURE — 80061 LIPID PANEL: CPT

## 2023-11-14 PROCEDURE — 84439 ASSAY OF FREE THYROXINE: CPT

## 2023-11-14 PROCEDURE — 84443 ASSAY THYROID STIM HORMONE: CPT

## 2023-11-14 PROCEDURE — 36415 COLL VENOUS BLD VENIPUNCTURE: CPT

## 2023-11-15 LAB
EST. AVERAGE GLUCOSE BLD GHB EST-MCNC: 154 MG/DL
HBA1C MFR BLD: 7 % (ref 4–6)

## 2023-11-17 ENCOUNTER — OFFICE VISIT (OUTPATIENT)
Dept: INTERNAL MEDICINE | Age: 28
End: 2023-11-17
Payer: COMMERCIAL

## 2023-11-17 VITALS
SYSTOLIC BLOOD PRESSURE: 110 MMHG | BODY MASS INDEX: 22.47 KG/M2 | WEIGHT: 119 LBS | HEIGHT: 61 IN | RESPIRATION RATE: 16 BRPM | DIASTOLIC BLOOD PRESSURE: 68 MMHG | HEART RATE: 78 BPM

## 2023-11-17 DIAGNOSIS — F31.9 BIPOLAR 1 DISORDER (HCC): ICD-10-CM

## 2023-11-17 DIAGNOSIS — E78.2 MIXED HYPERLIPIDEMIA: ICD-10-CM

## 2023-11-17 DIAGNOSIS — N80.9 ENDOMETRIOSIS: ICD-10-CM

## 2023-11-17 DIAGNOSIS — E10.65 UNCONTROLLED TYPE 1 DIABETES MELLITUS WITH HYPERGLYCEMIA, WITH LONG-TERM CURRENT USE OF INSULIN (HCC): Primary | ICD-10-CM

## 2023-11-17 PROCEDURE — 3051F HG A1C>EQUAL 7.0%<8.0%: CPT | Performed by: INTERNAL MEDICINE

## 2023-11-17 PROCEDURE — 99214 OFFICE O/P EST MOD 30 MIN: CPT | Performed by: INTERNAL MEDICINE

## 2023-11-17 SDOH — ECONOMIC STABILITY: FOOD INSECURITY: WITHIN THE PAST 12 MONTHS, THE FOOD YOU BOUGHT JUST DIDN'T LAST AND YOU DIDN'T HAVE MONEY TO GET MORE.: NEVER TRUE

## 2023-11-17 SDOH — ECONOMIC STABILITY: INCOME INSECURITY: HOW HARD IS IT FOR YOU TO PAY FOR THE VERY BASICS LIKE FOOD, HOUSING, MEDICAL CARE, AND HEATING?: NOT VERY HARD

## 2023-11-17 SDOH — ECONOMIC STABILITY: FOOD INSECURITY: WITHIN THE PAST 12 MONTHS, YOU WORRIED THAT YOUR FOOD WOULD RUN OUT BEFORE YOU GOT MONEY TO BUY MORE.: NEVER TRUE

## 2023-11-17 SDOH — ECONOMIC STABILITY: HOUSING INSECURITY
IN THE LAST 12 MONTHS, WAS THERE A TIME WHEN YOU DID NOT HAVE A STEADY PLACE TO SLEEP OR SLEPT IN A SHELTER (INCLUDING NOW)?: NO

## 2023-11-17 ASSESSMENT — PATIENT HEALTH QUESTIONNAIRE - PHQ9
8. MOVING OR SPEAKING SO SLOWLY THAT OTHER PEOPLE COULD HAVE NOTICED. OR THE OPPOSITE, BEING SO FIGETY OR RESTLESS THAT YOU HAVE BEEN MOVING AROUND A LOT MORE THAN USUAL: 0
1. LITTLE INTEREST OR PLEASURE IN DOING THINGS: 0
SUM OF ALL RESPONSES TO PHQ QUESTIONS 1-9: 12
10. IF YOU CHECKED OFF ANY PROBLEMS, HOW DIFFICULT HAVE THESE PROBLEMS MADE IT FOR YOU TO DO YOUR WORK, TAKE CARE OF THINGS AT HOME, OR GET ALONG WITH OTHER PEOPLE: 1
4. FEELING TIRED OR HAVING LITTLE ENERGY: 3
7. TROUBLE CONCENTRATING ON THINGS, SUCH AS READING THE NEWSPAPER OR WATCHING TELEVISION: 3
SUM OF ALL RESPONSES TO PHQ QUESTIONS 1-9: 11
6. FEELING BAD ABOUT YOURSELF - OR THAT YOU ARE A FAILURE OR HAVE LET YOURSELF OR YOUR FAMILY DOWN: 1
5. POOR APPETITE OR OVEREATING: 0
3. TROUBLE FALLING OR STAYING ASLEEP: 3
SUM OF ALL RESPONSES TO PHQ QUESTIONS 1-9: 12
9. THOUGHTS THAT YOU WOULD BE BETTER OFF DEAD, OR OF HURTING YOURSELF: 1
2. FEELING DOWN, DEPRESSED OR HOPELESS: 1
SUM OF ALL RESPONSES TO PHQ QUESTIONS 1-9: 12
SUM OF ALL RESPONSES TO PHQ9 QUESTIONS 1 & 2: 1

## 2023-11-17 ASSESSMENT — COLUMBIA-SUICIDE SEVERITY RATING SCALE - C-SSRS
3. HAVE YOU BEEN THINKING ABOUT HOW YOU MIGHT KILL YOURSELF?: NO
4. HAVE YOU HAD THESE THOUGHTS AND HAD SOME INTENTION OF ACTING ON THEM?: NO
1. WITHIN THE PAST MONTH, HAVE YOU WISHED YOU WERE DEAD OR WISHED YOU COULD GO TO SLEEP AND NOT WAKE UP?: YES
6. HAVE YOU EVER DONE ANYTHING, STARTED TO DO ANYTHING, OR PREPARED TO DO ANYTHING TO END YOUR LIFE?: NO
7. DID THIS OCCUR IN THE LAST THREE MONTHS: NO
5. HAVE YOU STARTED TO WORK OUT OR WORKED OUT THE DETAILS OF HOW TO KILL YOURSELF? DO YOU INTEND TO CARRY OUT THIS PLAN?: NO
BASED ON RESPONSES TO C-SSRS QS 1-6, WHAT IS THE PATIENT'S OVERALL RISK RATING FOR SUICIDE: LOW RISK
2. HAVE YOU ACTUALLY HAD ANY THOUGHTS OF KILLING YOURSELF?: NO

## 2023-11-17 NOTE — PROGRESS NOTES
Procedures    Hemoglobin A1C     Standing Status:   Future     Standing Expiration Date:   78/25/2508    Basic Metabolic Panel, Fasting     Standing Status:   Future     Standing Expiration Date:   11/17/2024    External Referral To Gynecology     Referral Priority:   Routine     Referral Type:   Eval and Treat     Referral Reason:   Specialty Services Required     Referred to Provider:   Helen Baker MD     Requested Specialty:   Gynecology     Number of Visits Requested:   1       Requested Prescriptions      No prescriptions requested or ordered in this encounter       Labs as ordered above. Return in about 2 months (around 1/17/2024).         Electronically signed by Rabia Song DO on 11/17/2023 at 4:06 PM  Internal Medicine

## 2023-12-11 DIAGNOSIS — E10.9 TYPE 1 DIABETES MELLITUS WITHOUT COMPLICATION (HCC): ICD-10-CM

## 2023-12-11 RX ORDER — INSULIN ASPART 100 [IU]/ML
INJECTION, SOLUTION INTRAVENOUS; SUBCUTANEOUS
Qty: 10 ML | Refills: 5 | Status: SHIPPED | OUTPATIENT
Start: 2023-12-11

## 2023-12-11 NOTE — TELEPHONE ENCOUNTER
Pharmacy requesting a refill of the below medication which has been pended for you:     Requested Prescriptions     Pending Prescriptions Disp Refills    NOVOLOG 100 UNIT/ML injection vial [Pharmacy Med Name: NovoLOG 100 UNIT/ML Subcutaneous Solution] 10 mL 0     Sig: USE INSULIN PUMP AS DIRECTED FOR A MAXIMUM OF 30 UNITS PER DAY       Last Appointment Date: 11/17/2023  Next Appointment Date: 1/17/2024    Allergies   Allergen Reactions    Other Hives and Swelling     Shrimp- Swelling around mouth    Citalopram Rash

## 2024-01-17 ENCOUNTER — OFFICE VISIT (OUTPATIENT)
Dept: INTERNAL MEDICINE | Age: 29
End: 2024-01-17
Payer: COMMERCIAL

## 2024-01-17 ENCOUNTER — OFFICE VISIT (OUTPATIENT)
Dept: DIABETES SERVICES | Age: 29
End: 2024-01-17
Payer: COMMERCIAL

## 2024-01-17 VITALS
BODY MASS INDEX: 22.28 KG/M2 | RESPIRATION RATE: 16 BRPM | HEART RATE: 66 BPM | HEIGHT: 61 IN | DIASTOLIC BLOOD PRESSURE: 68 MMHG | SYSTOLIC BLOOD PRESSURE: 122 MMHG | WEIGHT: 118 LBS

## 2024-01-17 VITALS
HEART RATE: 87 BPM | HEIGHT: 61 IN | WEIGHT: 118.6 LBS | DIASTOLIC BLOOD PRESSURE: 68 MMHG | BODY MASS INDEX: 22.39 KG/M2 | OXYGEN SATURATION: 95 % | SYSTOLIC BLOOD PRESSURE: 122 MMHG

## 2024-01-17 DIAGNOSIS — E10.9 TYPE 1 DIABETES MELLITUS WITHOUT COMPLICATION (HCC): Primary | ICD-10-CM

## 2024-01-17 DIAGNOSIS — E10.65 UNCONTROLLED TYPE 1 DIABETES MELLITUS WITH HYPERGLYCEMIA, WITH LONG-TERM CURRENT USE OF INSULIN (HCC): Primary | ICD-10-CM

## 2024-01-17 DIAGNOSIS — F31.9 BIPOLAR 1 DISORDER (HCC): ICD-10-CM

## 2024-01-17 DIAGNOSIS — N80.9 ENDOMETRIOSIS: ICD-10-CM

## 2024-01-17 DIAGNOSIS — E78.2 MIXED HYPERLIPIDEMIA: ICD-10-CM

## 2024-01-17 PROCEDURE — 99214 OFFICE O/P EST MOD 30 MIN: CPT | Performed by: INTERNAL MEDICINE

## 2024-01-17 PROCEDURE — 95251 CONT GLUC MNTR ANALYSIS I&R: CPT | Performed by: NURSE PRACTITIONER

## 2024-01-17 PROCEDURE — 99214 OFFICE O/P EST MOD 30 MIN: CPT | Performed by: NURSE PRACTITIONER

## 2024-01-17 ASSESSMENT — ENCOUNTER SYMPTOMS
DIARRHEA: 0
SHORTNESS OF BREATH: 0
RESPIRATORY NEGATIVE: 1
ABDOMINAL PAIN: 0

## 2024-01-17 NOTE — PROGRESS NOTES
DR. BEDOYA - PROGRESS NOTE    CHIEF COMPLAINT/HISTORY OF CHIEF COMPLAINT: This 28 y.o.  female comes in today for ongoing evaluation and management of her diabetes mellitus type 1, Bipolar disorder, hyperlipidemia, and endometriosis. She saw Dr. Novoa for her endometriosis and she is going to do a diagnostic laparoscopy next week at [facility] in [place]. She feels that Dr. Novoa is listening to her regarding her symptoms. She has been trying to watch her diet and maintain regular physical activity in the form of walking to try to keep her diabetes under control. She uses Novolog in her insulin pump for her diabetes, but she also has Lantus and insulin syringes as a backup in case her pump fails. She uses a Dexcom G6 CGM system to monitor her blood sugar levels. She follows with Liliane Duran to try and get her diabetes under control. Her last visit with her was in earlier this morning. Otherwise she seems to be doing fairly well and denies any other complaints.       ALLERGIES/INTOLERANCES:   Allergies   Allergen Reactions    Shellfish-Derived Products Hives and Swelling    Citalopram Rash       MEDICATIONS:   Outpatient Medications Marked as Taking for the 1/17/24 encounter (Office Visit) with Derrick Bedoya, DO   Medication Sig Dispense Refill    NOVOLOG 100 UNIT/ML injection vial USE INSULIN PUMP AS DIRECTED FOR A MAXIMUM OF 30 UNITS PER DAY 10 mL 5    Insulin Infusion Pump Supplies (INSULIN INFUSION PUMP INFU SET) MISC Change 90 degree autosoft infusion site every 2 days. 45 each 5    insulin glargine (LANTUS SOLOSTAR) 100 UNIT/ML injection pen Inject 8 Units into the skin nightly 5 Adjustable Dose Pre-filled Pen Syringe 3    Continuous Blood Gluc Sensor (DEXCOM G6 SENSOR) MISC USE  ONCE DAILY CHANGE  EVERY  10  DAYS 9 each 3    Continuous Blood Gluc Transmit (DEXCOM G6 TRANSMITTER) MISC Use daily to check blood sugars. Change every 90 days. 1 each 3

## 2024-01-17 NOTE — PROGRESS NOTES
[facility]  [address] [facility] [address] [phone]        HISTORY:    Layne Murillo presents today for evaluation and management of:  Chief Complaint   Patient presents with    Diabetes     6 month follow up       Patient Care Team:  Derrick Bedoya DO as PCP - General (Internal Medicine)  Derrick Bedoya DO as PCP - Empaneled Provider      Interval History:    Current Diabetic Medications  humalog for use in tslim insulin pump TDD 20 units     DKA episodes: age 17 with diagnosis    07/24/23   Layne Murillo is a 27 y.o. female patient who presents to clinic today for her diabetes. she has a history of non-compliance and depression/anxiety which contributes to her diabetes. At previous visit diabetes counseling was provided. she denies any current signs or symptoms of hyper/hypoglycemia. she states they are taking their medications as prescribed without any adverse effects. Would like to continue to use pump with 90 degree autosoft infusion site.    Diet: Counting carbohydrate  Exercise: cardio  BS testing: uses cgm daily with success and is adherent to cgm therapy  Issues: Denies  Diabetic foot exam up-to-date: No  Diabetic retinal exam up-to-date: No  Hypoglycemia as needed treatment: snack, apple juice, glucose tabs and glucose juice.   Has urine ketone strips.       01/17/24   Layne Murillo is a 28 y.o. female patient who presents to clinic today for her diabetes. she has a history of non-compliance and depression/anxiety which contributes to her diabetes. At previous visit diabetes counseling was provided. she denies any current signs or symptoms of hyper/hypoglycemia. she states they are taking their medications as prescribed without any adverse effects.   Diet: Counting carbohydrate  Exercise: cardio  BS testing: uses cgm daily with success and is adherent to cgm

## 2024-03-13 DIAGNOSIS — E10.9 TYPE 1 DIABETES MELLITUS WITHOUT COMPLICATION (HCC): ICD-10-CM

## 2024-03-13 RX ORDER — PROCHLORPERAZINE 25 MG/1
SUPPOSITORY RECTAL
Qty: 1 EACH | Refills: 3 | Status: SHIPPED | OUTPATIENT
Start: 2024-03-13

## 2024-03-18 DIAGNOSIS — E10.9 TYPE 1 DIABETES MELLITUS WITHOUT COMPLICATION (HCC): ICD-10-CM

## 2024-03-19 RX ORDER — PROCHLORPERAZINE 25 MG/1
SUPPOSITORY RECTAL
Qty: 3 EACH | Refills: 11 | Status: SHIPPED | OUTPATIENT
Start: 2024-03-19

## 2024-06-13 ENCOUNTER — OFFICE VISIT (OUTPATIENT)
Dept: INTERNAL MEDICINE | Age: 29
End: 2024-06-13
Payer: COMMERCIAL

## 2024-06-13 VITALS
DIASTOLIC BLOOD PRESSURE: 70 MMHG | HEIGHT: 61 IN | SYSTOLIC BLOOD PRESSURE: 102 MMHG | HEART RATE: 74 BPM | RESPIRATION RATE: 18 BRPM | BODY MASS INDEX: 21.71 KG/M2 | WEIGHT: 115 LBS

## 2024-06-13 DIAGNOSIS — E78.2 MIXED HYPERLIPIDEMIA: ICD-10-CM

## 2024-06-13 DIAGNOSIS — E10.65 UNCONTROLLED TYPE 1 DIABETES MELLITUS WITH HYPERGLYCEMIA, WITH LONG-TERM CURRENT USE OF INSULIN (HCC): Primary | ICD-10-CM

## 2024-06-13 DIAGNOSIS — F31.9 BIPOLAR 1 DISORDER (HCC): ICD-10-CM

## 2024-06-13 DIAGNOSIS — N80.9 ENDOMETRIOSIS: ICD-10-CM

## 2024-06-13 DIAGNOSIS — F32.81 PMDD (PREMENSTRUAL DYSPHORIC DISORDER): ICD-10-CM

## 2024-06-13 PROCEDURE — 99214 OFFICE O/P EST MOD 30 MIN: CPT | Performed by: INTERNAL MEDICINE

## 2024-06-13 RX ORDER — INSULIN ASPART 100 [IU]/ML
INJECTION, SOLUTION INTRAVENOUS; SUBCUTANEOUS
Qty: 20 ML | Refills: 5 | Status: SHIPPED | OUTPATIENT
Start: 2024-06-13

## 2024-06-13 ASSESSMENT — PATIENT HEALTH QUESTIONNAIRE - PHQ9
SUM OF ALL RESPONSES TO PHQ QUESTIONS 1-9: 4
SUM OF ALL RESPONSES TO PHQ QUESTIONS 1-9: 4
9. THOUGHTS THAT YOU WOULD BE BETTER OFF DEAD, OR OF HURTING YOURSELF: NOT AT ALL
10. IF YOU CHECKED OFF ANY PROBLEMS, HOW DIFFICULT HAVE THESE PROBLEMS MADE IT FOR YOU TO DO YOUR WORK, TAKE CARE OF THINGS AT HOME, OR GET ALONG WITH OTHER PEOPLE: SOMEWHAT DIFFICULT
5. POOR APPETITE OR OVEREATING: NOT AT ALL
SUM OF ALL RESPONSES TO PHQ QUESTIONS 1-9: 4
3. TROUBLE FALLING OR STAYING ASLEEP: NOT AT ALL
1. LITTLE INTEREST OR PLEASURE IN DOING THINGS: NOT AT ALL
7. TROUBLE CONCENTRATING ON THINGS, SUCH AS READING THE NEWSPAPER OR WATCHING TELEVISION: MORE THAN HALF THE DAYS
SUM OF ALL RESPONSES TO PHQ QUESTIONS 1-9: 4
8. MOVING OR SPEAKING SO SLOWLY THAT OTHER PEOPLE COULD HAVE NOTICED. OR THE OPPOSITE, BEING SO FIGETY OR RESTLESS THAT YOU HAVE BEEN MOVING AROUND A LOT MORE THAN USUAL: NOT AT ALL
2. FEELING DOWN, DEPRESSED OR HOPELESS: SEVERAL DAYS
4. FEELING TIRED OR HAVING LITTLE ENERGY: SEVERAL DAYS
6. FEELING BAD ABOUT YOURSELF - OR THAT YOU ARE A FAILURE OR HAVE LET YOURSELF OR YOUR FAMILY DOWN: NOT AT ALL
SUM OF ALL RESPONSES TO PHQ9 QUESTIONS 1 & 2: 1

## 2024-06-13 NOTE — PROGRESS NOTES
DR. RIOJAS - PROGRESS NOTE    CHIEF COMPLAINT/HISTORY OF CHIEF COMPLAINT: This 28 y.o.  female comes in today for ongoing evaluation and management of her diabetes mellitus type 1, Bipolar disorder, hyperlipidemia, and endometriosis. She was seeing Dr. Novoa for her endometriosis and had a diagnostic laparoscopy and ablation done at [facility] in [place]. However that doctor retired and so she is currently looking for a new ob-gyn. Their office provided her a list of alternative providers she can see. She would ideally like someone near her home in [place], [place]. She would like someone that is able to do both her annual exams and help with her endometriosis. She says her endometriosis symptoms have returned with a vengeance since her ablation. She felt relief for 2 months following the ablation but now her symptoms have returned. She is reporting heavy periods lasting 10 days or more that are extremely painful, and extreme mental dysfunction centered around the time of her period. She says leading up to her period she is very emotional, sad and crying, and unable to function. This impacts her relationships with others and her ability to work at her job. She has tried lamictal in the past for her bipolar disorder but was suffering from too many side effects. She is even considering a hysterectomy as she feels \"nothing has worked so far\" to alleviate her pain and symptoms. She is concerned something is wrong with her hormones.  She is desiring relief from the pain and anguish that she gets with her period. She uses midol, motrin, pamprin and a heating pad on her period with mild relief. She uses overnight pads and period underwear, 3 pads per day usually. She has been trying to watch her diet and maintain regular physical activity in the form of walking to try to keep her diabetes under control. She uses Novolog in her insulin pump for her diabetes, but she also has Lantus and

## 2024-09-04 ENCOUNTER — OFFICE VISIT (OUTPATIENT)
Dept: INTERNAL MEDICINE | Age: 29
End: 2024-09-04
Payer: COMMERCIAL

## 2024-09-04 ENCOUNTER — OFFICE VISIT (OUTPATIENT)
Dept: DIABETES SERVICES | Age: 29
End: 2024-09-04
Payer: COMMERCIAL

## 2024-09-04 VITALS
SYSTOLIC BLOOD PRESSURE: 108 MMHG | DIASTOLIC BLOOD PRESSURE: 66 MMHG | HEART RATE: 73 BPM | HEIGHT: 61 IN | WEIGHT: 113 LBS | OXYGEN SATURATION: 98 % | RESPIRATION RATE: 12 BRPM | BODY MASS INDEX: 21.34 KG/M2

## 2024-09-04 VITALS
HEART RATE: 72 BPM | WEIGHT: 113 LBS | SYSTOLIC BLOOD PRESSURE: 108 MMHG | BODY MASS INDEX: 21.34 KG/M2 | DIASTOLIC BLOOD PRESSURE: 66 MMHG | HEIGHT: 61 IN | RESPIRATION RATE: 16 BRPM

## 2024-09-04 DIAGNOSIS — F31.9 BIPOLAR 1 DISORDER (HCC): ICD-10-CM

## 2024-09-04 DIAGNOSIS — E10.9 TYPE 1 DIABETES MELLITUS WITHOUT COMPLICATION (HCC): Primary | ICD-10-CM

## 2024-09-04 DIAGNOSIS — N80.9 ENDOMETRIOSIS: ICD-10-CM

## 2024-09-04 DIAGNOSIS — E78.2 MIXED HYPERLIPIDEMIA: ICD-10-CM

## 2024-09-04 DIAGNOSIS — E10.65 UNCONTROLLED TYPE 1 DIABETES MELLITUS WITH HYPERGLYCEMIA, WITH LONG-TERM CURRENT USE OF INSULIN (HCC): Primary | ICD-10-CM

## 2024-09-04 PROCEDURE — 95251 CONT GLUC MNTR ANALYSIS I&R: CPT | Performed by: NURSE PRACTITIONER

## 2024-09-04 PROCEDURE — G2211 COMPLEX E/M VISIT ADD ON: HCPCS | Performed by: NURSE PRACTITIONER

## 2024-09-04 PROCEDURE — 99214 OFFICE O/P EST MOD 30 MIN: CPT | Performed by: NURSE PRACTITIONER

## 2024-09-04 PROCEDURE — 99214 OFFICE O/P EST MOD 30 MIN: CPT | Performed by: INTERNAL MEDICINE

## 2024-09-04 RX ORDER — GLUCOSAMINE HCL/CHONDROITIN SU 500-400 MG
50 CAPSULE ORAL DAILY
Qty: 50 STRIP | Refills: 5 | Status: SHIPPED | OUTPATIENT
Start: 2024-09-04

## 2024-09-04 NOTE — PROGRESS NOTES
DR. BEDOYA - PROGRESS NOTE    CHIEF COMPLAINT/HISTORY OF CHIEF COMPLAINT: This 29 y.o.  female comes in today for ongoing evaluation and management of her diabetes mellitus type 1, Bipolar disorder, hyperlipidemia, and endometriosis. She did not get her blood work done for today. She just found a new gynecologist (Dr. Rizzo in [place]) to see for her endometriosis and saw them for the first time in August. She discussed various options for treating her endometriosis. At this point she is leaning towards taking Orlissa for it, but she wanted to talk to us and to Liliane Duran about it first before going ahead with it. She has been trying to watch her diet and maintain regular physical activity in the form of walking to try to keep her diabetes under control. She uses Novolog in her insulin pump for her diabetes, but she also has Lantus and insulin syringes as a backup in case her pump fails. She uses a Dexcom G6 CGM system to monitor her blood sugar levels. She follows with Liliane Duran to try and get her diabetes under control. She just saw her earlier today. Otherwise she seems to be doing fairly well and denies any other complaints.      ALLERGIES/INTOLERANCES:   Allergies   Allergen Reactions    Shellfish-Derived Products Hives and Swelling    Citalopram Rash       MEDICATIONS:   Outpatient Medications Marked as Taking for the 9/4/24 encounter (Office Visit) with Derrick Bedoya, DO   Medication Sig Dispense Refill    blood glucose monitor strips 50 strips by Other route daily Test 1 times a day uses one touch verio 50 strip 5    NOVOLOG 100 UNIT/ML injection vial USE IN INSULIN PUMP AS DIRECTED FOR A MAXIMUM OF 60 UNITS PER DAY 20 mL 5    Continuous Blood Gluc Sensor (DEXCOM G6 SENSOR) MISC USE AS DIRECTED ,  CHANGE  EVERY  10  DAYS 3 each 11    Continuous Blood Gluc Transmit (DEXCOM G6 TRANSMITTER) MISC USE DAILY TO CHECK BLOOD SUGARS. CHANGE EVERY 90 DAYS 1 each 3    Insulin Infusion

## 2024-09-04 NOTE — PROGRESS NOTES
Layne received counseling on the following healthy behaviors: nutrition and exercise  Reviewed prior labs and health maintenance  Continue current medications, diet and exercise.  Discussed use, benefit, and side effects of prescribed medications. Barriers to medication compliance addressed.   Patient given educational materials - see patient instructions  Was a self-tracking handout given in paper form or via [employer]? Yes    Requested Prescriptions      No prescriptions requested or ordered in this encounter       All patient questions answered.  Patient voiced understanding.    Quality Measures    Body mass index is 21.35 kg/m². Normal. Weight control plan discussed: Healthy diet and regular exercise.    BP: 108/66 Blood pressure is normal. Treatment plan: See main progress note.    No results found for: \"LDLDIRECT\" (goal LDL reduction with dx if diabetes is 50% LDL reduction)          6/13/2024    10:39 AM 11/17/2023     2:46 PM 11/11/2021     4:19 PM 12/22/2020     9:29 AM   PHQ Scores   PHQ2 Score 1 1 4 0   PHQ9 Score 4 12 9 0     See progress note for plan, if depression exists.  Interpretation of Total Score:  Major depression if the answer to questions 1 or 2 and 5 or more of questions 1 to 9 are at least \"More than half the days.\"  Other depressive syndrome if questions 1 or 2 and two, three, or four of questions 1 to 9 are at least \"More than half the days\"   Depression Severity: 5-9 = Mild depression, 10-14 = Moderate depression, 15-19 = Moderately severe depression, 20-27 = Severe depression

## 2024-09-04 NOTE — PROGRESS NOTES
Dispense: 50 strip; Refill: 5            Answered all patient questions. Agrees to follow plan of care and to follow up in 3 months, sooner if needed. Call office if unexplained blood sugars less than 70 occur or above 400. Call office or access [facility] with any further questions or concerns. Be sure to bring glucometer/food log at next appointment.       Total time spent reviewing chart, labs, counseling patient and documenting on the date of the encounter: 30 min.      Electronically signed by CONY Boggs CNP on 9/5/2024 at 1:31 PM      (Please note that portions of this note were completed with a voice-recognition program. Efforts were made to edit the dictation but occasionally words are mis-transcribed.)

## 2024-09-05 ASSESSMENT — ENCOUNTER SYMPTOMS
SHORTNESS OF BREATH: 0
ABDOMINAL PAIN: 0
RESPIRATORY NEGATIVE: 1
DIARRHEA: 0

## 2024-10-23 NOTE — PROGRESS NOTES
Layne received counseling on the following healthy behaviors: nutrition and exercise  Reviewed prior labs and health maintenance  Continue current medications, diet and exercise.  Discussed use, benefit, and side effects of prescribed medications. Barriers to medication compliance addressed.   Patient given educational materials - see patient instructions  Was a self-tracking handout given in paper form or via [employer]? Yes    Requested Prescriptions      No prescriptions requested or ordered in this encounter       All patient questions answered.  Patient voiced understanding.    Quality Measures    Body mass index is 22.3 kg/m². Normal. Weight control plan discussed: Healthy diet and regular exercise.    BP: 122/68 Blood pressure is normal. Treatment plan: See main progress note.    Lab Results   Component Value Date    LDLCALC 109 05/18/2017    LDLCHOLESTEROL 118 11/14/2023    (goal LDL reduction with dx if diabetes is 50% LDL reduction)          11/17/2023     2:46 PM 11/11/2021     4:19 PM 12/22/2020     9:29 AM   PHQ Scores   PHQ2 Score 1 4 0   PHQ9 Score 12 9 0     See progress note for plan, if depression exists.  Interpretation of Total Score:  Major depression if the answer to questions 1 or 2 and 5 or more of questions 1 to 9 are at least \"More than half the days.\"  Other depressive syndrome if questions 1 or 2 and two, three, or four of questions 1 to 9 are at least \"More than half the days\"   Depression Severity: 5-9 = Mild depression, 10-14 = Moderate depression, 15-19 = Moderately severe depression, 20-27 = Severe depression    Yes

## 2024-12-02 ENCOUNTER — HOSPITAL ENCOUNTER (OUTPATIENT)
Age: 29
Discharge: HOME | End: 2024-12-02
Payer: COMMERCIAL

## 2024-12-02 DIAGNOSIS — N80.9 ENDOMETRIOSIS: ICD-10-CM

## 2024-12-02 DIAGNOSIS — E10.9 TYPE 1 DIABETES MELLITUS WITHOUT COMPLICATION (HCC): ICD-10-CM

## 2024-12-02 DIAGNOSIS — E10.65 UNCONTROLLED TYPE 1 DIABETES MELLITUS WITH HYPERGLYCEMIA, WITH LONG-TERM CURRENT USE OF INSULIN (HCC): ICD-10-CM

## 2024-12-02 DIAGNOSIS — E78.2 MIXED HYPERLIPIDEMIA: ICD-10-CM

## 2024-12-02 LAB
ALBUMIN SERPL-MCNC: 4.9 G/DL (ref 3.5–5.2)
ALBUMIN/GLOB SERPL: 1.6 {RATIO} (ref 1–2.5)
ALP SERPL-CCNC: 63 U/L (ref 35–104)
ALT SERPL-CCNC: 10 U/L (ref 5–33)
AMORPH SED URNS QL MICRO: ABNORMAL
ANION GAP SERPL CALCULATED.3IONS-SCNC: 12 MMOL/L (ref 9–17)
AST SERPL-CCNC: 18 U/L
BASOPHILS # BLD: 0.09 K/UL (ref 0–0.2)
BASOPHILS NFR BLD: 1 % (ref 0–2)
BILIRUB SERPL-MCNC: 0.5 MG/DL (ref 0.3–1.2)
BILIRUB UR QL STRIP: NEGATIVE
BUN SERPL-MCNC: 10 MG/DL (ref 6–20)
BUN/CREAT SERPL: 17 (ref 9–20)
CALCIUM SERPL-MCNC: 9.8 MG/DL (ref 8.6–10.4)
CHARACTER UR: ABNORMAL
CHLORIDE SERPL-SCNC: 99 MMOL/L (ref 98–107)
CLARITY UR: ABNORMAL
CO2 SERPL-SCNC: 27 MMOL/L (ref 20–31)
COLOR UR: YELLOW
CREAT SERPL-MCNC: 0.6 MG/DL (ref 0.5–0.9)
EOSINOPHIL # BLD: 0.13 K/UL (ref 0–0.44)
EOSINOPHILS RELATIVE PERCENT: 2 % (ref 1–4)
EPI CELLS #/AREA URNS HPF: ABNORMAL /HPF (ref 0–5)
ERYTHROCYTE [DISTWIDTH] IN BLOOD BY AUTOMATED COUNT: 11.8 % (ref 11.8–14.4)
EST. AVERAGE GLUCOSE BLD GHB EST-MCNC: 154 MG/DL
GFR, ESTIMATED: >90 ML/MIN/1.73M2
GLUCOSE P FAST SERPL-MCNC: 178 MG/DL (ref 70–99)
GLUCOSE UR STRIP-MCNC: NEGATIVE MG/DL
HBA1C MFR BLD: 7 % (ref 4–6)
HCT VFR BLD AUTO: 44.8 % (ref 36.3–47.1)
HGB BLD-MCNC: 15.4 G/DL (ref 11.9–15.1)
HGB UR QL STRIP.AUTO: NEGATIVE
IMM GRANULOCYTES # BLD AUTO: 0.03 K/UL (ref 0–0.3)
IMM GRANULOCYTES NFR BLD: 0 %
KETONES UR STRIP-MCNC: ABNORMAL MG/DL
LEUKOCYTE ESTERASE UR QL STRIP: NEGATIVE
LYMPHOCYTES NFR BLD: 2.26 K/UL (ref 1.1–3.7)
LYMPHOCYTES RELATIVE PERCENT: 26 % (ref 24–43)
MCH RBC QN AUTO: 30.1 PG (ref 25.2–33.5)
MCHC RBC AUTO-ENTMCNC: 34.4 G/DL (ref 25.2–33.5)
MCV RBC AUTO: 87.5 FL (ref 82.6–102.9)
MONOCYTES NFR BLD: 0.55 K/UL (ref 0.1–1.2)
MONOCYTES NFR BLD: 6 % (ref 3–12)
NEUTROPHILS NFR BLD: 65 % (ref 36–65)
NEUTS SEG NFR BLD: 5.71 K/UL (ref 1.5–8.1)
NITRITE UR QL STRIP: NEGATIVE
NRBC BLD-RTO: 0 PER 100 WBC
PH UR STRIP: 7.5 [PH] (ref 5–6)
PLATELET # BLD AUTO: 312 K/UL (ref 138–453)
PMV BLD AUTO: 9 FL (ref 8.1–13.5)
POTASSIUM SERPL-SCNC: 4.6 MMOL/L (ref 3.7–5.3)
PROT SERPL-MCNC: 8 G/DL (ref 6.4–8.3)
PROT UR STRIP-MCNC: NEGATIVE MG/DL
RBC # BLD AUTO: 5.12 M/UL (ref 3.95–5.11)
RBC #/AREA URNS HPF: ABNORMAL /HPF (ref 0–4)
SODIUM SERPL-SCNC: 138 MMOL/L (ref 135–144)
SP GR UR STRIP: 1.02 (ref 1.01–1.02)
UROBILINOGEN UR STRIP-ACNC: NORMAL EU/DL (ref 0–1)
WBC #/AREA URNS HPF: ABNORMAL /HPF (ref 0–4)
WBC OTHER # BLD: 8.8 K/UL (ref 3.5–11.3)

## 2024-12-02 PROCEDURE — 82570 ASSAY OF URINE CREATININE: CPT

## 2024-12-02 PROCEDURE — 80061 LIPID PANEL: CPT

## 2024-12-02 PROCEDURE — 80053 COMPREHEN METABOLIC PANEL: CPT

## 2024-12-02 PROCEDURE — 83036 HEMOGLOBIN GLYCOSYLATED A1C: CPT

## 2024-12-02 PROCEDURE — 36415 COLL VENOUS BLD VENIPUNCTURE: CPT

## 2024-12-02 PROCEDURE — 82043 UR ALBUMIN QUANTITATIVE: CPT

## 2024-12-02 PROCEDURE — 85025 COMPLETE CBC W/AUTO DIFF WBC: CPT

## 2024-12-02 PROCEDURE — 81001 URINALYSIS AUTO W/SCOPE: CPT

## 2024-12-03 LAB
CHOLEST SERPL-MCNC: 246 MG/DL (ref 0–199)
CHOLESTEROL/HDL RATIO: 2.6
CREAT UR-MCNC: 180 MG/DL (ref 28–217)
EST. AVERAGE GLUCOSE BLD GHB EST-MCNC: 154 MG/DL
HBA1C MFR BLD: 7 % (ref 4–6)
HDLC SERPL-MCNC: 93 MG/DL
LDLC SERPL CALC-MCNC: 144 MG/DL (ref 0–100)
MICROALBUMIN UR-MCNC: 39 MG/L (ref 0–20)
MICROALBUMIN/CREAT UR-RTO: 22 MCG/MG CREAT (ref 0–25)
TRIGL SERPL-MCNC: 46 MG/DL
VLDLC SERPL CALC-MCNC: 9 MG/DL (ref 1–30)

## 2024-12-04 ENCOUNTER — OFFICE VISIT (OUTPATIENT)
Dept: DIABETES SERVICES | Age: 29
End: 2024-12-04
Payer: COMMERCIAL

## 2024-12-04 ENCOUNTER — OFFICE VISIT (OUTPATIENT)
Dept: INTERNAL MEDICINE | Age: 29
End: 2024-12-04

## 2024-12-04 VITALS
BODY MASS INDEX: 21.71 KG/M2 | HEART RATE: 86 BPM | SYSTOLIC BLOOD PRESSURE: 120 MMHG | HEIGHT: 61 IN | RESPIRATION RATE: 16 BRPM | DIASTOLIC BLOOD PRESSURE: 60 MMHG | WEIGHT: 115 LBS

## 2024-12-04 VITALS
HEART RATE: 87 BPM | OXYGEN SATURATION: 98 % | WEIGHT: 115 LBS | DIASTOLIC BLOOD PRESSURE: 60 MMHG | BODY MASS INDEX: 21.71 KG/M2 | HEIGHT: 61 IN | SYSTOLIC BLOOD PRESSURE: 120 MMHG

## 2024-12-04 DIAGNOSIS — F31.9 BIPOLAR 1 DISORDER (HCC): ICD-10-CM

## 2024-12-04 DIAGNOSIS — E10.9 TYPE 1 DIABETES MELLITUS WITHOUT COMPLICATION (HCC): Primary | ICD-10-CM

## 2024-12-04 DIAGNOSIS — E78.2 MIXED HYPERLIPIDEMIA: ICD-10-CM

## 2024-12-04 DIAGNOSIS — Z23 INFLUENZA VACCINE NEEDED: ICD-10-CM

## 2024-12-04 DIAGNOSIS — E10.65 UNCONTROLLED TYPE 1 DIABETES MELLITUS WITH HYPERGLYCEMIA, WITH LONG-TERM CURRENT USE OF INSULIN (HCC): Primary | ICD-10-CM

## 2024-12-04 DIAGNOSIS — N80.9 ENDOMETRIOSIS: ICD-10-CM

## 2024-12-04 DIAGNOSIS — F32.81 PMDD (PREMENSTRUAL DYSPHORIC DISORDER): ICD-10-CM

## 2024-12-04 PROCEDURE — 95251 CONT GLUC MNTR ANALYSIS I&R: CPT | Performed by: NURSE PRACTITIONER

## 2024-12-04 PROCEDURE — 99214 OFFICE O/P EST MOD 30 MIN: CPT | Performed by: NURSE PRACTITIONER

## 2024-12-04 PROCEDURE — 3051F HG A1C>EQUAL 7.0%<8.0%: CPT | Performed by: NURSE PRACTITIONER

## 2024-12-04 PROCEDURE — G2211 COMPLEX E/M VISIT ADD ON: HCPCS | Performed by: NURSE PRACTITIONER

## 2024-12-04 RX ORDER — ELAGOLIX 150 MG/1
1 TABLET, FILM COATED ORAL EVERY MORNING
COMMUNITY

## 2024-12-04 SDOH — ECONOMIC STABILITY: FOOD INSECURITY: WITHIN THE PAST 12 MONTHS, YOU WORRIED THAT YOUR FOOD WOULD RUN OUT BEFORE YOU GOT MONEY TO BUY MORE.: NEVER TRUE

## 2024-12-04 SDOH — ECONOMIC STABILITY: INCOME INSECURITY: HOW HARD IS IT FOR YOU TO PAY FOR THE VERY BASICS LIKE FOOD, HOUSING, MEDICAL CARE, AND HEATING?: NOT HARD AT ALL

## 2024-12-04 SDOH — ECONOMIC STABILITY: FOOD INSECURITY: WITHIN THE PAST 12 MONTHS, THE FOOD YOU BOUGHT JUST DIDN'T LAST AND YOU DIDN'T HAVE MONEY TO GET MORE.: NEVER TRUE

## 2024-12-04 ASSESSMENT — ENCOUNTER SYMPTOMS
RESPIRATORY NEGATIVE: 1
SHORTNESS OF BREATH: 0

## 2024-12-04 NOTE — PROGRESS NOTES
(or guardian, if applicable) and other individuals in attendance at the appointment consented to the use of AI, including the recording.            Electronically signed by CONY Boggs CNP on 12/4/2024 at 4:22 PM      (Please note that portions of this note were completed with a voice-recognition program. Efforts were made to edit the dictation but occasionally words are mis-transcribed.)

## 2024-12-04 NOTE — PROGRESS NOTES
DR. BEDOYA - PROGRESS NOTE    CHIEF COMPLAINT/HISTORY OF CHIEF COMPLAINT: This 29 y.o.  female comes in today for ongoing evaluation and management of her diabetes mellitus type 1, Bipolar disorder, hyperlipidemia, and endometriosis. She started taking the Orilissa for her endometriosis, which was given to her by Dr. Rizzo. She has only been taking it for 5 weeks so far, but she has already noticed a significant improvement in her pain and even in her overall mood as well. She has not noticed much change in her cycle just yet. She will be going back to Dr. Rizzo's office next week for a recheck. She has been trying to watch her diet and maintain regular physical activity in the form of walking to try to keep her diabetes under control. She uses Novolog in her insulin pump for her diabetes, but she also has Lantus and insulin syringes as a backup in case her pump fails. She uses a Dexcom G6 CGM system to monitor her blood sugar levels. She follows with Liliane Duran to try and get her diabetes under control. Her last visit with her was earlier today. Otherwise she seems to be doing fairly well and denies any other complaints. She would like to get her flu shot today.       ALLERGIES/INTOLERANCES:   Allergies   Allergen Reactions    Shellfish-Derived Products Hives and Swelling    Citalopram Rash       MEDICATIONS:   Outpatient Medications Marked as Taking for the 12/4/24 encounter (Office Visit) with Derrick Bedoya, DO   Medication Sig Dispense Refill    ORILISSA 150 MG TABS Take 1 tablet by mouth every morning      blood glucose monitor strips 50 strips by Other route daily Test 1 times a day uses one touch verio 50 strip 5    NOVOLOG 100 UNIT/ML injection vial USE IN INSULIN PUMP AS DIRECTED FOR A MAXIMUM OF 60 UNITS PER DAY 20 mL 5    Continuous Blood Gluc Sensor (DEXCOM G6 SENSOR) MISC USE AS DIRECTED ,  CHANGE  EVERY  10  DAYS 3 each 11    Continuous Blood Gluc Transmit (DEXCOM G6

## 2024-12-05 LAB
CREAT UR-MCNC: 180 MG/DL (ref 28–217)
MICROALBUMIN UR-MCNC: 39 MG/L (ref 0–20)
MICROALBUMIN/CREAT UR-RTO: 22 MCG/MG CREAT (ref 0–25)

## 2025-03-14 ENCOUNTER — OFFICE VISIT (OUTPATIENT)
Dept: INTERNAL MEDICINE | Age: 30
End: 2025-03-14
Payer: COMMERCIAL

## 2025-03-14 ENCOUNTER — OFFICE VISIT (OUTPATIENT)
Dept: DIABETES SERVICES | Age: 30
End: 2025-03-14

## 2025-03-14 VITALS
SYSTOLIC BLOOD PRESSURE: 118 MMHG | DIASTOLIC BLOOD PRESSURE: 64 MMHG | WEIGHT: 114 LBS | BODY MASS INDEX: 21.52 KG/M2 | HEIGHT: 61 IN | RESPIRATION RATE: 16 BRPM | HEART RATE: 64 BPM

## 2025-03-14 VITALS
HEART RATE: 65 BPM | OXYGEN SATURATION: 99 % | SYSTOLIC BLOOD PRESSURE: 118 MMHG | RESPIRATION RATE: 16 BRPM | HEIGHT: 61 IN | WEIGHT: 114 LBS | BODY MASS INDEX: 21.52 KG/M2 | DIASTOLIC BLOOD PRESSURE: 64 MMHG

## 2025-03-14 DIAGNOSIS — F31.9 BIPOLAR 1 DISORDER (HCC): ICD-10-CM

## 2025-03-14 DIAGNOSIS — E10.65 UNCONTROLLED TYPE 1 DIABETES MELLITUS WITH HYPERGLYCEMIA, WITH LONG-TERM CURRENT USE OF INSULIN (HCC): Primary | ICD-10-CM

## 2025-03-14 DIAGNOSIS — E78.2 MIXED HYPERLIPIDEMIA: ICD-10-CM

## 2025-03-14 DIAGNOSIS — N80.9 ENDOMETRIOSIS: ICD-10-CM

## 2025-03-14 DIAGNOSIS — R23.2 HOT FLASHES: ICD-10-CM

## 2025-03-14 DIAGNOSIS — F32.81 PMDD (PREMENSTRUAL DYSPHORIC DISORDER): ICD-10-CM

## 2025-03-14 DIAGNOSIS — E10.9 TYPE 1 DIABETES MELLITUS WITHOUT COMPLICATION (HCC): Primary | ICD-10-CM

## 2025-03-14 PROCEDURE — 99214 OFFICE O/P EST MOD 30 MIN: CPT | Performed by: INTERNAL MEDICINE

## 2025-03-14 SDOH — ECONOMIC STABILITY: FOOD INSECURITY: WITHIN THE PAST 12 MONTHS, YOU WORRIED THAT YOUR FOOD WOULD RUN OUT BEFORE YOU GOT MONEY TO BUY MORE.: NEVER TRUE

## 2025-03-14 SDOH — ECONOMIC STABILITY: FOOD INSECURITY: WITHIN THE PAST 12 MONTHS, THE FOOD YOU BOUGHT JUST DIDN'T LAST AND YOU DIDN'T HAVE MONEY TO GET MORE.: NEVER TRUE

## 2025-03-14 ASSESSMENT — PATIENT HEALTH QUESTIONNAIRE - PHQ9
1. LITTLE INTEREST OR PLEASURE IN DOING THINGS: NOT AT ALL
8. MOVING OR SPEAKING SO SLOWLY THAT OTHER PEOPLE COULD HAVE NOTICED. OR THE OPPOSITE, BEING SO FIGETY OR RESTLESS THAT YOU HAVE BEEN MOVING AROUND A LOT MORE THAN USUAL: NOT AT ALL
9. THOUGHTS THAT YOU WOULD BE BETTER OFF DEAD, OR OF HURTING YOURSELF: NOT AT ALL
6. FEELING BAD ABOUT YOURSELF - OR THAT YOU ARE A FAILURE OR HAVE LET YOURSELF OR YOUR FAMILY DOWN: NOT AT ALL
SUM OF ALL RESPONSES TO PHQ QUESTIONS 1-9: 6
3. TROUBLE FALLING OR STAYING ASLEEP: NEARLY EVERY DAY
SUM OF ALL RESPONSES TO PHQ QUESTIONS 1-9: 6
10. IF YOU CHECKED OFF ANY PROBLEMS, HOW DIFFICULT HAVE THESE PROBLEMS MADE IT FOR YOU TO DO YOUR WORK, TAKE CARE OF THINGS AT HOME, OR GET ALONG WITH OTHER PEOPLE: NOT DIFFICULT AT ALL
4. FEELING TIRED OR HAVING LITTLE ENERGY: NEARLY EVERY DAY
5. POOR APPETITE OR OVEREATING: NOT AT ALL
2. FEELING DOWN, DEPRESSED OR HOPELESS: NOT AT ALL
SUM OF ALL RESPONSES TO PHQ QUESTIONS 1-9: 6
7. TROUBLE CONCENTRATING ON THINGS, SUCH AS READING THE NEWSPAPER OR WATCHING TELEVISION: NOT AT ALL
SUM OF ALL RESPONSES TO PHQ QUESTIONS 1-9: 6

## 2025-03-14 ASSESSMENT — ENCOUNTER SYMPTOMS
RESPIRATORY NEGATIVE: 1
SHORTNESS OF BREATH: 0

## 2025-03-14 NOTE — PROGRESS NOTES
Patient Care Team:  Derrick Bedoya DO as PCP - General (Internal Medicine)  Derrick Bedoya DO as PCP - Empaneled Provider    Current Diabetic Medications  humalog for use in tslim insulin pump TDD 25 units  Lantus as needed for pump failure      DKA episodes: age 17 with diagnosis    History of Present Illness  The patient presents for a review of their type 1 diabetes management.    The patient is currently utilizing an insulin pump with NovoLog, with a maximum daily dose of 60 units. Lantus is available as a backup in the event of pump failure. They also employ the Dexcom G6 continuous glucose monitor for glycemic control. They have a scheduled appointment with Dr. Rosetta mendieta at 10:30 AM. The patient reports that their highest glucose levels typically occur postprandially, particularly after dinner around 7 PM. They occasionally experience episodes of insulin depletion. The patient manually adjusts their insulin dosage when they perceive that the Control-IQ technology is not adequately lowering their blood glucose levels or when it is excessively reducing their levels. They also report variable responses to insulin, with some days showing a significant drop in blood glucose within 30 to 45 minutes of administration, while on other days, the same dose may only reduce their levels to 100 mg/dL or have no effect at all. They express apprehension about using the Control-IQ due to these inconsistencies. The patient is currently using the Dexcom G6 and has expressed interest in upgrading to the G7, but has encountered issues with the lyssa on their phone.    The patient has been on Orilissa for endometriosis-associated pain for a couple of months. They are uncertain if the medication is contributing to their fatigue or if it is due to poor sleep quality. They experience nocturnal hot flashes, waking up every hour to two hours. These hot flashes were initially mild but have progressively worsened.

## 2025-03-14 NOTE — PATIENT INSTRUCTIONS
Look into potentially trying black cohosh or evening primrose oil for help in controlling the hot flashes until you can see your gynecologist

## 2025-03-14 NOTE — PROGRESS NOTES
Layne received counseling on the following healthy behaviors: nutrition and exercise  Reviewed prior labs and health maintenance  Continue current medications, diet and exercise.  Discussed use, benefit, and side effects of prescribed medications. Barriers to medication compliance addressed.   Patient given educational materials - see patient instructions  Was a self-tracking handout given in paper form or via [employer]? Yes    Requested Prescriptions      No prescriptions requested or ordered in this encounter       All patient questions answered.  Patient voiced understanding.    Quality Measures    Body mass index is 21.54 kg/m². Normal. Weight control plan discussed: Healthy diet and regular exercise.    BP: 118/64 Blood pressure is normal. Treatment plan: See main progress note.    No results found for: \"LDLDIRECT\" (goal LDL reduction with dx if diabetes is 50% LDL reduction)          3/14/2025    10:34 AM 6/13/2024    10:39 AM 11/17/2023     2:46 PM 11/11/2021     4:19 PM 12/22/2020     9:29 AM   PHQ Scores   PHQ2 Score 0 1 1 4 0   PHQ9 Score 6 4 12 9 0     See progress note for plan, if depression exists.  Interpretation of Total Score:  Major depression if the answer to questions 1 or 2 and 5 or more of questions 1 to 9 are at least \"More than half the days.\"  Other depressive syndrome if questions 1 or 2 and two, three, or four of questions 1 to 9 are at least \"More than half the days\"   Depression Severity: 5-9 = Mild depression, 10-14 = Moderate depression, 15-19 = Moderately severe depression, 20-27 = Severe depression   
better control.    - She will continue to see Liliane Duran  - We reviewed her most recent visit note   - Basic Metabolic Panel, Fasting; Future  - Hemoglobin A1C; Future    2. Mixed hyperlipidemia, stable  - She will continue to watch her diet and exercise     3. Bipolar 1 disorder (HCC), stable  - We will continue to monitor      4. Endometriosis, stable  5. Hot flashes, worse  - She will continue to see Dr. Rizzo's group  - The Orilissa very well could be making her hot flashes worse  - She should talk to Dr. Rizzo about whether to stop taking it or not  - She can also look into potentially trying black cohosh or evening primrose oil for help in controlling the hot flashes as well    6. PMDD (premenstrual dysphoric disorder), stable  - We will continue to monitor       Orders Placed This Encounter   Procedures    Basic Metabolic Panel, Fasting     Standing Status:   Future     Expected Date:   6/12/2025     Expiration Date:   3/14/2026    Hemoglobin A1C     Standing Status:   Future     Expected Date:   6/12/2025     Expiration Date:   3/14/2026       Requested Prescriptions      No prescriptions requested or ordered in this encounter       Labs as ordered above. Return in about 3 months (around 6/14/2025) for DM/Labs.        Electronically signed by DANE RIOJAS DO on 3/14/2025 at 11:28 AM  Internal Medicine

## 2025-04-22 DIAGNOSIS — E10.9 TYPE 1 DIABETES MELLITUS WITHOUT COMPLICATION (HCC): ICD-10-CM

## 2025-04-23 RX ORDER — PROCHLORPERAZINE 25 MG/1
SUPPOSITORY RECTAL
Qty: 3 EACH | Refills: 5 | Status: SHIPPED | OUTPATIENT
Start: 2025-04-23 | End: 2025-04-24 | Stop reason: SDUPTHER

## 2025-04-24 DIAGNOSIS — E10.65 UNCONTROLLED TYPE 1 DIABETES MELLITUS WITH HYPERGLYCEMIA, WITH LONG-TERM CURRENT USE OF INSULIN (HCC): ICD-10-CM

## 2025-04-24 DIAGNOSIS — E10.9 TYPE 1 DIABETES MELLITUS WITHOUT COMPLICATION (HCC): ICD-10-CM

## 2025-04-24 RX ORDER — PROCHLORPERAZINE 25 MG/1
SUPPOSITORY RECTAL
Qty: 3 EACH | Refills: 5 | Status: SHIPPED | OUTPATIENT
Start: 2025-04-24

## 2025-04-24 RX ORDER — PROCHLORPERAZINE 25 MG/1
SUPPOSITORY RECTAL
Qty: 1 EACH | Refills: 3 | Status: SHIPPED | OUTPATIENT
Start: 2025-04-24

## 2025-04-24 RX ORDER — INSULIN ASPART 100 [IU]/ML
INJECTION, SOLUTION INTRAVENOUS; SUBCUTANEOUS
Qty: 20 ML | Refills: 5 | Status: SHIPPED | OUTPATIENT
Start: 2025-04-24

## 2025-04-24 NOTE — TELEPHONE ENCOUNTER
Layne called requesting a refill of the below medication which has been pended for you:     Requested Prescriptions     Pending Prescriptions Disp Refills    Continuous Glucose Sensor (DEXCOM G6 SENSOR) MISC 3 each 5     Sig: USE AS DIRECTED CHANGING EVERY 10 DAYS    NOVOLOG 100 UNIT/ML injection vial 20 mL 5     Sig: USE IN INSULIN PUMP AS DIRECTED FOR A MAXIMUM OF 60 UNITS PER DAY    Continuous Glucose Transmitter (DEXCOM G6 TRANSMITTER) MISC 1 each 3     Sig: Use daily to check blood sugars. Change every 90 days.       Last Appointment Date: 3/14/2025  Next Appointment Date: 6/16/2025    Allergies   Allergen Reactions    Shellfish-Derived Products Hives and Swelling    Citalopram Rash       
information could not be obtained